# Patient Record
Sex: FEMALE | Race: WHITE
[De-identification: names, ages, dates, MRNs, and addresses within clinical notes are randomized per-mention and may not be internally consistent; named-entity substitution may affect disease eponyms.]

---

## 2018-11-02 ENCOUNTER — HOSPITAL ENCOUNTER (INPATIENT)
Dept: HOSPITAL 83 - SDC | Age: 73
LOS: 6 days | Discharge: HOME | DRG: 641 | End: 2018-11-08
Attending: INTERNAL MEDICINE | Admitting: INTERNAL MEDICINE
Payer: MEDICARE

## 2018-11-02 VITALS — WEIGHT: 65.44 LBS | BODY MASS INDEX: 12.04 KG/M2 | HEIGHT: 61.97 IN

## 2018-11-02 DIAGNOSIS — E87.6: Primary | ICD-10-CM

## 2018-11-02 DIAGNOSIS — Z91.19: ICD-10-CM

## 2018-11-02 DIAGNOSIS — Z93.1: ICD-10-CM

## 2018-11-02 DIAGNOSIS — Z90.49: ICD-10-CM

## 2018-11-02 DIAGNOSIS — I10: ICD-10-CM

## 2018-11-02 DIAGNOSIS — K57.30: ICD-10-CM

## 2018-11-02 DIAGNOSIS — Z90.721: ICD-10-CM

## 2018-11-02 DIAGNOSIS — E44.0: ICD-10-CM

## 2018-11-02 DIAGNOSIS — Z79.899: ICD-10-CM

## 2018-11-02 DIAGNOSIS — Z79.52: ICD-10-CM

## 2018-11-02 DIAGNOSIS — K44.9: ICD-10-CM

## 2018-11-02 DIAGNOSIS — E27.40: ICD-10-CM

## 2018-11-02 DIAGNOSIS — M19.90: ICD-10-CM

## 2018-11-02 DIAGNOSIS — K29.60: ICD-10-CM

## 2018-11-02 DIAGNOSIS — E87.1: ICD-10-CM

## 2018-11-02 DIAGNOSIS — M06.9: ICD-10-CM

## 2018-11-02 DIAGNOSIS — F32.9: ICD-10-CM

## 2018-11-02 DIAGNOSIS — M79.7: ICD-10-CM

## 2018-11-02 DIAGNOSIS — I25.10: ICD-10-CM

## 2018-11-02 DIAGNOSIS — Z85.3: ICD-10-CM

## 2018-11-02 DIAGNOSIS — E03.9: ICD-10-CM

## 2018-11-05 VITALS — DIASTOLIC BLOOD PRESSURE: 88 MMHG | SYSTOLIC BLOOD PRESSURE: 139 MMHG

## 2018-11-05 VITALS — SYSTOLIC BLOOD PRESSURE: 144 MMHG | DIASTOLIC BLOOD PRESSURE: 90 MMHG

## 2018-11-05 VITALS — SYSTOLIC BLOOD PRESSURE: 146 MMHG | DIASTOLIC BLOOD PRESSURE: 76 MMHG

## 2018-11-05 VITALS — DIASTOLIC BLOOD PRESSURE: 80 MMHG | SYSTOLIC BLOOD PRESSURE: 131 MMHG

## 2018-11-05 LAB
CHLORIDE SERPL-SCNC: 96 MMOL/L (ref 98–107)
POTASSIUM SERPL-SCNC: 2.1 MMOL/L (ref 3.5–5.1)
SODIUM SERPL-SCNC: 133 MMOL/L (ref 136–145)

## 2018-11-06 VITALS — DIASTOLIC BLOOD PRESSURE: 84 MMHG

## 2018-11-06 VITALS — DIASTOLIC BLOOD PRESSURE: 75 MMHG | SYSTOLIC BLOOD PRESSURE: 140 MMHG

## 2018-11-06 VITALS — DIASTOLIC BLOOD PRESSURE: 77 MMHG | SYSTOLIC BLOOD PRESSURE: 143 MMHG

## 2018-11-06 VITALS — DIASTOLIC BLOOD PRESSURE: 65 MMHG

## 2018-11-06 VITALS — SYSTOLIC BLOOD PRESSURE: 103 MMHG | DIASTOLIC BLOOD PRESSURE: 64 MMHG

## 2018-11-06 VITALS — DIASTOLIC BLOOD PRESSURE: 66 MMHG

## 2018-11-06 VITALS — DIASTOLIC BLOOD PRESSURE: 75 MMHG | SYSTOLIC BLOOD PRESSURE: 116 MMHG

## 2018-11-06 VITALS — DIASTOLIC BLOOD PRESSURE: 73 MMHG

## 2018-11-06 LAB
25(OH)D3 SERPL-MCNC: 17.5 NG/ML (ref 30–100)
ALBUMIN SERPL-MCNC: 2.5 GM/DL (ref 3.1–4.5)
ALBUMIN SERPL-MCNC: 2.5 GM/DL (ref 3.1–4.5)
ALP SERPL-CCNC: 328 U/L (ref 45–117)
ALP SERPL-CCNC: 344 U/L (ref 45–117)
ALT SERPL W P-5'-P-CCNC: 13 U/L (ref 12–78)
ALT SERPL W P-5'-P-CCNC: 14 U/L (ref 12–78)
APTT PPP: 25.9 SECONDS (ref 20.8–31.5)
AST SERPL-CCNC: 26 IU/L (ref 3–35)
AST SERPL-CCNC: 30 IU/L (ref 3–35)
BASOPHILS # BLD AUTO: 0.1 10*3/UL (ref 0–0.1)
BASOPHILS NFR BLD AUTO: 0.9 % (ref 0–1)
BUN SERPL-MCNC: 4 MG/DL (ref 7–24)
BUN SERPL-MCNC: 5 MG/DL (ref 7–24)
CHLORIDE SERPL-SCNC: 103 MMOL/L (ref 98–107)
CHLORIDE SERPL-SCNC: 104 MMOL/L (ref 98–107)
CHOLEST SERPL-MCNC: 118 MG/DL (ref ?–200)
CREAT SERPL-MCNC: 0.64 MG/DL (ref 0.55–1.02)
CREAT SERPL-MCNC: 0.66 MG/DL (ref 0.55–1.02)
EOSINOPHIL # BLD AUTO: 0.5 10*3/UL (ref 0–0.4)
EOSINOPHIL # BLD AUTO: 5.6 % (ref 1–4)
ERYTHROCYTE [DISTWIDTH] IN BLOOD BY AUTOMATED COUNT: 18.1 % (ref 0–14.5)
HCT VFR BLD AUTO: 33.4 % (ref 37–47)
HDLC SERPL-MCNC: 55 MG/DL (ref 40–60)
HGB BLD-MCNC: 10.6 G/DL (ref 12–16)
INR BLD: 1.1 (ref 2–3.5)
LDLC SERPL DIRECT ASSAY-MCNC: 40 MG/DL (ref 9–159)
LYMPHOCYTES # BLD AUTO: 2.1 10*3/UL (ref 1.3–4.4)
LYMPHOCYTES NFR BLD AUTO: 24.2 % (ref 27–41)
MCH RBC QN AUTO: 26.1 PG (ref 27–31)
MCHC RBC AUTO-ENTMCNC: 31.7 G/DL (ref 33–37)
MCV RBC AUTO: 82.3 FL (ref 81–99)
MONOCYTES # BLD AUTO: 0.9 10*3/UL (ref 0.1–1)
MONOCYTES NFR BLD MANUAL: 10.7 % (ref 3–9)
NEUT #: 5.1 10*3/UL (ref 2.3–7.9)
NEUT %: 58.3 % (ref 47–73)
NRBC BLD QL AUTO: 0 10*3/UL (ref 0–0)
PHOSPHATE SERPL-MCNC: 1.3 MG/DL (ref 2.5–4.9)
PHOSPHATE SERPL-MCNC: 1.5 MG/DL (ref 2.5–4.9)
PLATELET # BLD AUTO: 341 10*3/UL (ref 130–400)
PMV BLD AUTO: 9.6 FL (ref 9.6–12.3)
POTASSIUM SERPL-SCNC: 3.1 MMOL/L (ref 3.5–5.1)
POTASSIUM SERPL-SCNC: 3.3 MMOL/L (ref 3.5–5.1)
PROT SERPL-MCNC: 5.2 GM/DL (ref 6.4–8.2)
PROT SERPL-MCNC: 5.4 GM/DL (ref 6.4–8.2)
RBC # BLD AUTO: 4.06 10*6/UL (ref 4.1–5.1)
SODIUM SERPL-SCNC: 133 MMOL/L (ref 136–145)
SODIUM SERPL-SCNC: 135 MMOL/L (ref 136–145)
T4 FREE SERPL-MCNC: 0.8 NG/DL (ref 0.76–1.46)
TRIGL SERPL-MCNC: 114 MG/DL (ref ?–150)
TROPONIN I SERPL-MCNC: 0.03 NG/ML (ref ?–0.04)
TSH SERPL DL<=0.005 MIU/L-ACNC: 18.7 UIU/ML (ref 0.36–4.75)
VITAMIN B12: 607 PG/ML (ref 247–911)
VLDLC SERPL CALC-MCNC: 23 MG/DL (ref 6–40)
WBC NRBC COR # BLD AUTO: 8.8 10*3/UL (ref 4.8–10.8)

## 2018-11-07 VITALS — SYSTOLIC BLOOD PRESSURE: 118 MMHG | DIASTOLIC BLOOD PRESSURE: 71 MMHG

## 2018-11-07 VITALS — DIASTOLIC BLOOD PRESSURE: 82 MMHG | SYSTOLIC BLOOD PRESSURE: 128 MMHG

## 2018-11-07 VITALS — DIASTOLIC BLOOD PRESSURE: 89 MMHG

## 2018-11-07 VITALS — SYSTOLIC BLOOD PRESSURE: 112 MMHG | DIASTOLIC BLOOD PRESSURE: 88 MMHG

## 2018-11-07 LAB
ALBUMIN SERPL-MCNC: 2.4 GM/DL (ref 3.1–4.5)
ALP SERPL-CCNC: 337 U/L (ref 45–117)
ALT SERPL W P-5'-P-CCNC: 13 U/L (ref 12–78)
AST SERPL-CCNC: 27 IU/L (ref 3–35)
BUN SERPL-MCNC: 2 MG/DL (ref 7–24)
CHLORIDE SERPL-SCNC: 104 MMOL/L (ref 98–107)
CREAT SERPL-MCNC: 0.56 MG/DL (ref 0.55–1.02)
PHOSPHATE SERPL-MCNC: 2 MG/DL (ref 2.5–4.9)
POTASSIUM SERPL-SCNC: 2.9 MMOL/L (ref 3.5–5.1)
PROT SERPL-MCNC: 5 GM/DL (ref 6.4–8.2)
SODIUM SERPL-SCNC: 135 MMOL/L (ref 136–145)

## 2018-11-07 PROCEDURE — 0DJD8ZZ INSPECTION OF LOWER INTESTINAL TRACT, VIA NATURAL OR ARTIFICIAL OPENING ENDOSCOPIC: ICD-10-PCS

## 2018-11-07 PROCEDURE — 0DJ08ZZ INSPECTION OF UPPER INTESTINAL TRACT, VIA NATURAL OR ARTIFICIAL OPENING ENDOSCOPIC: ICD-10-PCS

## 2018-11-08 VITALS — DIASTOLIC BLOOD PRESSURE: 70 MMHG

## 2018-11-08 VITALS — SYSTOLIC BLOOD PRESSURE: 136 MMHG | DIASTOLIC BLOOD PRESSURE: 80 MMHG

## 2018-11-08 VITALS — DIASTOLIC BLOOD PRESSURE: 77 MMHG

## 2018-11-08 LAB
ALBUMIN SERPL-MCNC: 2.2 GM/DL (ref 3.1–4.5)
ALP SERPL-CCNC: 341 U/L (ref 45–117)
ALT SERPL W P-5'-P-CCNC: 13 U/L (ref 12–78)
AST SERPL-CCNC: 24 IU/L (ref 3–35)
BUN SERPL-MCNC: 4 MG/DL (ref 7–24)
CHLORIDE SERPL-SCNC: 103 MMOL/L (ref 98–107)
CREAT SERPL-MCNC: 0.57 MG/DL (ref 0.55–1.02)
PHOSPHATE SERPL-MCNC: 3 MG/DL (ref 2.5–4.9)
POTASSIUM SERPL-SCNC: 3.3 MMOL/L (ref 3.5–5.1)
PROT SERPL-MCNC: 4.6 GM/DL (ref 6.4–8.2)
SODIUM SERPL-SCNC: 136 MMOL/L (ref 136–145)

## 2019-01-01 ENCOUNTER — HOSPITAL ENCOUNTER (INPATIENT)
Age: 74
LOS: 4 days | Discharge: NURSING, REHAB OR LONG-TERM CARE | DRG: 552 | End: 2019-12-13
Attending: EMERGENCY MEDICINE | Admitting: INTERNAL MEDICINE
Payer: MEDICARE

## 2019-01-01 ENCOUNTER — APPOINTMENT (OUTPATIENT)
Dept: MRI IMAGING | Age: 74
DRG: 552 | End: 2019-01-01
Payer: MEDICARE

## 2019-01-01 ENCOUNTER — HOSPITAL ENCOUNTER (OUTPATIENT)
Age: 74
Setting detail: SPECIMEN
Discharge: HOME | End: 2019-11-06
Payer: MEDICARE

## 2019-01-01 ENCOUNTER — HOSPITAL ENCOUNTER (OUTPATIENT)
Dept: PREOP | Age: 74
Discharge: HOME | End: 2019-11-07
Payer: MEDICARE

## 2019-01-01 ENCOUNTER — APPOINTMENT (OUTPATIENT)
Dept: GENERAL RADIOLOGY | Age: 74
DRG: 552 | End: 2019-01-01
Payer: MEDICARE

## 2019-01-01 ENCOUNTER — HOSPITAL ENCOUNTER (EMERGENCY)
Age: 74
Discharge: HOME | End: 2019-12-04
Attending: EMERGENCY MEDICINE
Payer: MEDICARE

## 2019-01-01 ENCOUNTER — APPOINTMENT (OUTPATIENT)
Dept: CT IMAGING | Age: 74
DRG: 552 | End: 2019-01-01
Payer: MEDICARE

## 2019-01-01 VITALS
BODY MASS INDEX: 13.9 KG/M2 | SYSTOLIC BLOOD PRESSURE: 162 MMHG | DIASTOLIC BLOOD PRESSURE: 100 MMHG | RESPIRATION RATE: 20 BRPM | TEMPERATURE: 97 F | WEIGHT: 76 LBS | HEART RATE: 100 BPM | OXYGEN SATURATION: 97 %

## 2019-01-01 VITALS
BODY MASS INDEX: 14.35 KG/M2 | SYSTOLIC BLOOD PRESSURE: 152 MMHG | TEMPERATURE: 98 F | WEIGHT: 76 LBS | OXYGEN SATURATION: 93 % | RESPIRATION RATE: 18 BRPM | DIASTOLIC BLOOD PRESSURE: 77 MMHG | HEIGHT: 61 IN | HEART RATE: 73 BPM

## 2019-01-01 VITALS
RESPIRATION RATE: 20 BRPM | TEMPERATURE: 98.1 F | DIASTOLIC BLOOD PRESSURE: 90 MMHG | HEART RATE: 86 BPM | OXYGEN SATURATION: 96 % | SYSTOLIC BLOOD PRESSURE: 186 MMHG

## 2019-01-01 DIAGNOSIS — M54.32 BILATERAL SCIATICA: Primary | ICD-10-CM

## 2019-01-01 DIAGNOSIS — M54.31 BILATERAL SCIATICA: Primary | ICD-10-CM

## 2019-01-01 LAB
ABO/RH: NORMAL
ALBUMIN SERPL-MCNC: 3.3 G/DL (ref 3.5–5.2)
ALP BLD-CCNC: 283 U/L (ref 35–104)
ALT SERPL-CCNC: 14 U/L (ref 0–32)
ANION GAP SERPL CALCULATED.3IONS-SCNC: 12 MMOL/L (ref 7–16)
ANION GAP SERPL CALCULATED.3IONS-SCNC: 14 MMOL/L (ref 7–16)
ANTIBODY IDENTIFICATION: NORMAL
ANTIBODY IDENTIFICATION: NORMAL
ANTIBODY SCREEN: NORMAL
APTT: 28.3 SEC (ref 24.5–35.1)
AST SERPL-CCNC: 19 U/L (ref 0–31)
BACTERIA: NORMAL /HPF
BASOPHILS ABSOLUTE: 0.02 E9/L (ref 0–0.2)
BASOPHILS ABSOLUTE: 0.03 E9/L (ref 0–0.2)
BASOPHILS RELATIVE PERCENT: 0.2 % (ref 0–2)
BASOPHILS RELATIVE PERCENT: 0.2 % (ref 0–2)
BILIRUB SERPL-MCNC: 0.3 MG/DL (ref 0–1.2)
BILIRUBIN URINE: NEGATIVE
BLOOD BANK DISPENSE STATUS: NORMAL
BLOOD BANK DISPENSE STATUS: NORMAL
BLOOD BANK PRODUCT CODE: NORMAL
BLOOD BANK PRODUCT CODE: NORMAL
BLOOD CULTURE, ROUTINE: NORMAL
BLOOD, URINE: NEGATIVE
BPU ID: NORMAL
BPU ID: NORMAL
BUN BLDV-MCNC: 14 MG/DL (ref 8–23)
BUN BLDV-MCNC: 19 MG/DL (ref 8–23)
C ANTIGEN: NORMAL
CALCIUM SERPL-MCNC: 8.6 MG/DL (ref 8.6–10.2)
CALCIUM SERPL-MCNC: 8.7 MG/DL (ref 8.6–10.2)
CHLORIDE BLD-SCNC: 101 MMOL/L (ref 98–107)
CHLORIDE BLD-SCNC: 98 MMOL/L (ref 98–107)
CLARITY: CLEAR
CO2: 18 MMOL/L (ref 22–29)
CO2: 21 MMOL/L (ref 22–29)
COLOR: YELLOW
CREAT SERPL-MCNC: 0.8 MG/DL (ref 0.5–1)
CREAT SERPL-MCNC: 0.8 MG/DL (ref 0.5–1)
CULTURE, BLOOD 2: NORMAL
DESCRIPTION BLOOD BANK: NORMAL
DESCRIPTION BLOOD BANK: NORMAL
E ANTIGEN: NORMAL
EKG ATRIAL RATE: 76 BPM
EKG P AXIS: 78 DEGREES
EKG P-R INTERVAL: 156 MS
EKG Q-T INTERVAL: 372 MS
EKG QRS DURATION: 68 MS
EKG QTC CALCULATION (BAZETT): 418 MS
EKG R AXIS: -69 DEGREES
EKG T AXIS: 66 DEGREES
EKG VENTRICULAR RATE: 76 BPM
EOSINOPHILS ABSOLUTE: 0.11 E9/L (ref 0.05–0.5)
EOSINOPHILS ABSOLUTE: 0.12 E9/L (ref 0.05–0.5)
EOSINOPHILS RELATIVE PERCENT: 0.7 % (ref 0–6)
EOSINOPHILS RELATIVE PERCENT: 1 % (ref 0–6)
GFR AFRICAN AMERICAN: >60
GFR AFRICAN AMERICAN: >60
GFR NON-AFRICAN AMERICAN: >60 ML/MIN/1.73
GFR NON-AFRICAN AMERICAN: >60 ML/MIN/1.73
GLUCOSE BLD-MCNC: 104 MG/DL (ref 74–99)
GLUCOSE BLD-MCNC: 75 MG/DL (ref 74–99)
GLUCOSE URINE: NEGATIVE MG/DL
HCT VFR BLD CALC: 38.1 % (ref 34–48)
HCT VFR BLD CALC: 39.7 % (ref 34–48)
HEMOGLOBIN: 11.9 G/DL (ref 11.5–15.5)
HEMOGLOBIN: 12.4 G/DL (ref 11.5–15.5)
IMMATURE GRANULOCYTES #: 0.08 E9/L
IMMATURE GRANULOCYTES #: 0.1 E9/L
IMMATURE GRANULOCYTES %: 0.6 % (ref 0–5)
IMMATURE GRANULOCYTES %: 0.6 % (ref 0–5)
INR BLD: 1
KETONES, URINE: NEGATIVE MG/DL
LACTIC ACID, SEPSIS: 0.7 MMOL/L (ref 0.5–1.9)
LEUKOCYTE ESTERASE, URINE: NEGATIVE
LIPASE: 17 U/L (ref 13–60)
LYMPHOCYTES ABSOLUTE: 1.77 E9/L (ref 1.5–4)
LYMPHOCYTES ABSOLUTE: 1.8 E9/L (ref 1.5–4)
LYMPHOCYTES RELATIVE PERCENT: 10.9 % (ref 20–42)
LYMPHOCYTES RELATIVE PERCENT: 14.4 % (ref 20–42)
MCH RBC QN AUTO: 26.2 PG (ref 26–35)
MCH RBC QN AUTO: 26.3 PG (ref 26–35)
MCHC RBC AUTO-ENTMCNC: 31.2 % (ref 32–34.5)
MCHC RBC AUTO-ENTMCNC: 31.2 % (ref 32–34.5)
MCV RBC AUTO: 83.9 FL (ref 80–99.9)
MCV RBC AUTO: 84.3 FL (ref 80–99.9)
MONOCYTES ABSOLUTE: 0.89 E9/L (ref 0.1–0.95)
MONOCYTES ABSOLUTE: 1.2 E9/L (ref 0.1–0.95)
MONOCYTES RELATIVE PERCENT: 7.1 % (ref 2–12)
MONOCYTES RELATIVE PERCENT: 7.4 % (ref 2–12)
NEUTROPHILS ABSOLUTE: 13.04 E9/L (ref 1.8–7.3)
NEUTROPHILS ABSOLUTE: 9.59 E9/L (ref 1.8–7.3)
NEUTROPHILS RELATIVE PERCENT: 76.7 % (ref 43–80)
NEUTROPHILS RELATIVE PERCENT: 80.2 % (ref 43–80)
NITRITE, URINE: NEGATIVE
PDW BLD-RTO: 17.2 FL (ref 11.5–15)
PDW BLD-RTO: 17.3 FL (ref 11.5–15)
PH UA: 7.5 (ref 5–9)
PLATELET # BLD: 327 E9/L (ref 130–450)
PLATELET # BLD: 344 E9/L (ref 130–450)
PMV BLD AUTO: 8.9 FL (ref 7–12)
PMV BLD AUTO: 9 FL (ref 7–12)
POTASSIUM REFLEX MAGNESIUM: 3.6 MMOL/L (ref 3.5–5)
POTASSIUM REFLEX MAGNESIUM: 3.8 MMOL/L (ref 3.5–5)
PROTEIN UA: 100 MG/DL
PROTHROMBIN TIME: 11.7 SEC (ref 9.3–12.4)
RBC # BLD: 4.52 E12/L (ref 3.5–5.5)
RBC # BLD: 4.73 E12/L (ref 3.5–5.5)
RBC UA: NORMAL /HPF (ref 0–2)
SODIUM BLD-SCNC: 131 MMOL/L (ref 132–146)
SODIUM BLD-SCNC: 133 MMOL/L (ref 132–146)
SPECIFIC GRAVITY UA: 1.01 (ref 1–1.03)
TOTAL PROTEIN: 6.8 G/DL (ref 6.4–8.3)
TROPONIN: <0.01 NG/ML (ref 0–0.03)
URINE CULTURE, ROUTINE: NORMAL
UROBILINOGEN, URINE: 0.2 E.U./DL
WBC # BLD: 12.5 E9/L (ref 4.5–11.5)
WBC # BLD: 16.3 E9/L (ref 4.5–11.5)
WBC UA: NORMAL /HPF (ref 0–5)

## 2019-01-01 PROCEDURE — 36415 COLL VENOUS BLD VENIPUNCTURE: CPT

## 2019-01-01 PROCEDURE — 86900 BLOOD TYPING SEROLOGIC ABO: CPT

## 2019-01-01 PROCEDURE — 87088 URINE BACTERIA CULTURE: CPT

## 2019-01-01 PROCEDURE — P9016 RBC LEUKOCYTES REDUCED: HCPCS

## 2019-01-01 PROCEDURE — 1200000000 HC SEMI PRIVATE

## 2019-01-01 PROCEDURE — 2580000003 HC RX 258: Performed by: INTERNAL MEDICINE

## 2019-01-01 PROCEDURE — 6370000000 HC RX 637 (ALT 250 FOR IP): Performed by: INTERNAL MEDICINE

## 2019-01-01 PROCEDURE — 84484 ASSAY OF TROPONIN QUANT: CPT

## 2019-01-01 PROCEDURE — 99222 1ST HOSP IP/OBS MODERATE 55: CPT | Performed by: NEUROLOGICAL SURGERY

## 2019-01-01 PROCEDURE — 86901 BLOOD TYPING SEROLOGIC RH(D): CPT

## 2019-01-01 PROCEDURE — 97530 THERAPEUTIC ACTIVITIES: CPT

## 2019-01-01 PROCEDURE — 85025 COMPLETE CBC W/AUTO DIFF WBC: CPT

## 2019-01-01 PROCEDURE — 72148 MRI LUMBAR SPINE W/O DYE: CPT

## 2019-01-01 PROCEDURE — 81001 URINALYSIS AUTO W/SCOPE: CPT

## 2019-01-01 PROCEDURE — 83605 ASSAY OF LACTIC ACID: CPT

## 2019-01-01 PROCEDURE — 86870 RBC ANTIBODY IDENTIFICATION: CPT

## 2019-01-01 PROCEDURE — 6360000002 HC RX W HCPCS: Performed by: INTERNAL MEDICINE

## 2019-01-01 PROCEDURE — 83690 ASSAY OF LIPASE: CPT

## 2019-01-01 PROCEDURE — 86905 BLOOD TYPING RBC ANTIGENS: CPT

## 2019-01-01 PROCEDURE — 97161 PT EVAL LOW COMPLEX 20 MIN: CPT

## 2019-01-01 PROCEDURE — 96372 THER/PROPH/DIAG INJ SC/IM: CPT

## 2019-01-01 PROCEDURE — G0381 LEV 2 HOSP TYPE B ED VISIT: HCPCS

## 2019-01-01 PROCEDURE — 86850 RBC ANTIBODY SCREEN: CPT

## 2019-01-01 PROCEDURE — 70450 CT HEAD/BRAIN W/O DYE: CPT

## 2019-01-01 PROCEDURE — 85610 PROTHROMBIN TIME: CPT

## 2019-01-01 PROCEDURE — 71045 X-RAY EXAM CHEST 1 VIEW: CPT

## 2019-01-01 PROCEDURE — 99285 EMERGENCY DEPT VISIT HI MDM: CPT

## 2019-01-01 PROCEDURE — 87040 BLOOD CULTURE FOR BACTERIA: CPT

## 2019-01-01 PROCEDURE — 97166 OT EVAL MOD COMPLEX 45 MIN: CPT

## 2019-01-01 PROCEDURE — 36430 TRANSFUSION BLD/BLD COMPNT: CPT

## 2019-01-01 PROCEDURE — 96374 THER/PROPH/DIAG INJ IV PUSH: CPT

## 2019-01-01 PROCEDURE — 93010 ELECTROCARDIOGRAM REPORT: CPT | Performed by: INTERNAL MEDICINE

## 2019-01-01 PROCEDURE — 6360000002 HC RX W HCPCS: Performed by: EMERGENCY MEDICINE

## 2019-01-01 PROCEDURE — 86922 COMPATIBILITY TEST ANTIGLOB: CPT

## 2019-01-01 PROCEDURE — 72131 CT LUMBAR SPINE W/O DYE: CPT

## 2019-01-01 PROCEDURE — 93005 ELECTROCARDIOGRAM TRACING: CPT | Performed by: EMERGENCY MEDICINE

## 2019-01-01 PROCEDURE — 85730 THROMBOPLASTIN TIME PARTIAL: CPT

## 2019-01-01 PROCEDURE — 80048 BASIC METABOLIC PNL TOTAL CA: CPT

## 2019-01-01 PROCEDURE — 80053 COMPREHEN METABOLIC PANEL: CPT

## 2019-01-01 RX ORDER — CEFAZOLIN SODIUM 1 G/50ML
1 SOLUTION INTRAVENOUS SEE ADMIN INSTRUCTIONS
Status: DISCONTINUED | OUTPATIENT
Start: 2019-01-01 | End: 2019-01-01 | Stop reason: HOSPADM

## 2019-01-01 RX ORDER — QUETIAPINE FUMARATE 25 MG/1
12.5 TABLET, FILM COATED ORAL EVERY 6 HOURS PRN
Qty: 60 TABLET | Refills: 3 | Status: ON HOLD | DISCHARGE
Start: 2019-01-01 | End: 2020-01-01 | Stop reason: HOSPADM

## 2019-01-01 RX ORDER — ASPIRIN 81 MG/1
81 TABLET ORAL DAILY
Status: DISCONTINUED | OUTPATIENT
Start: 2019-01-01 | End: 2019-01-01 | Stop reason: HOSPADM

## 2019-01-01 RX ORDER — ACETAMINOPHEN 325 MG/1
650 TABLET ORAL EVERY 4 HOURS PRN
Status: DISCONTINUED | OUTPATIENT
Start: 2019-01-01 | End: 2019-01-01 | Stop reason: HOSPADM

## 2019-01-01 RX ORDER — MELOXICAM 7.5 MG/1
15 TABLET ORAL DAILY
Status: DISCONTINUED | OUTPATIENT
Start: 2019-01-01 | End: 2019-01-01 | Stop reason: HOSPADM

## 2019-01-01 RX ORDER — ONDANSETRON 2 MG/ML
4 INJECTION INTRAMUSCULAR; INTRAVENOUS EVERY 6 HOURS PRN
Status: DISCONTINUED | OUTPATIENT
Start: 2019-01-01 | End: 2019-01-01 | Stop reason: HOSPADM

## 2019-01-01 RX ORDER — OXYCODONE AND ACETAMINOPHEN 7.5; 325 MG/1; MG/1
1 TABLET ORAL EVERY 6 HOURS PRN
Status: DISCONTINUED | OUTPATIENT
Start: 2019-01-01 | End: 2019-01-01 | Stop reason: SDUPTHER

## 2019-01-01 RX ORDER — PREDNISONE 1 MG/1
5 TABLET ORAL DAILY
Status: DISCONTINUED | OUTPATIENT
Start: 2019-01-01 | End: 2019-01-01 | Stop reason: HOSPADM

## 2019-01-01 RX ORDER — MELOXICAM 15 MG/1
15 TABLET ORAL DAILY
Qty: 30 TABLET | Refills: 0 | Status: ON HOLD | OUTPATIENT
Start: 2019-01-01 | End: 2020-01-01 | Stop reason: HOSPADM

## 2019-01-01 RX ORDER — DEXAMETHASONE SODIUM PHOSPHATE 10 MG/ML
10 INJECTION, SOLUTION INTRAMUSCULAR; INTRAVENOUS ONCE
Status: COMPLETED | OUTPATIENT
Start: 2019-01-01 | End: 2019-01-01

## 2019-01-01 RX ORDER — SODIUM CHLORIDE 0.9 % (FLUSH) 0.9 %
10 SYRINGE (ML) INJECTION EVERY 12 HOURS SCHEDULED
Status: DISCONTINUED | OUTPATIENT
Start: 2019-01-01 | End: 2019-01-01 | Stop reason: HOSPADM

## 2019-01-01 RX ORDER — CALCIUM CARBONATE 200(500)MG
500 TABLET,CHEWABLE ORAL 2 TIMES DAILY
Status: DISCONTINUED | OUTPATIENT
Start: 2019-01-01 | End: 2019-01-01 | Stop reason: HOSPADM

## 2019-01-01 RX ORDER — PREGABALIN 150 MG/1
150 CAPSULE ORAL 3 TIMES DAILY
Status: DISCONTINUED | OUTPATIENT
Start: 2019-01-01 | End: 2019-01-01 | Stop reason: HOSPADM

## 2019-01-01 RX ORDER — SODIUM CHLORIDE 0.9 % (FLUSH) 0.9 %
10 SYRINGE (ML) INJECTION PRN
Status: DISCONTINUED | OUTPATIENT
Start: 2019-01-01 | End: 2019-01-01 | Stop reason: HOSPADM

## 2019-01-01 RX ORDER — 0.9 % SODIUM CHLORIDE 0.9 %
250 INTRAVENOUS SOLUTION INTRAVENOUS ONCE
Status: COMPLETED | OUTPATIENT
Start: 2019-01-01 | End: 2019-01-01

## 2019-01-01 RX ORDER — CITALOPRAM 20 MG/1
20 TABLET ORAL DAILY
Status: DISCONTINUED | OUTPATIENT
Start: 2019-01-01 | End: 2019-01-01 | Stop reason: HOSPADM

## 2019-01-01 RX ORDER — KETOROLAC TROMETHAMINE 30 MG/ML
30 INJECTION, SOLUTION INTRAMUSCULAR; INTRAVENOUS ONCE
Status: COMPLETED | OUTPATIENT
Start: 2019-01-01 | End: 2019-01-01

## 2019-01-01 RX ORDER — LEVOTHYROXINE SODIUM 0.05 MG/1
50 TABLET ORAL DAILY
Status: DISCONTINUED | OUTPATIENT
Start: 2019-01-01 | End: 2019-01-01 | Stop reason: HOSPADM

## 2019-01-01 RX ORDER — OXYCODONE HYDROCHLORIDE AND ACETAMINOPHEN 5; 325 MG/1; MG/1
1 TABLET ORAL EVERY 6 HOURS PRN
Status: DISCONTINUED | OUTPATIENT
Start: 2019-01-01 | End: 2019-01-01 | Stop reason: HOSPADM

## 2019-01-01 RX ORDER — OXYCODONE HYDROCHLORIDE 5 MG/1
2.5 TABLET ORAL EVERY 6 HOURS PRN
Status: DISCONTINUED | OUTPATIENT
Start: 2019-01-01 | End: 2019-01-01 | Stop reason: HOSPADM

## 2019-01-01 RX ORDER — ISOSORBIDE MONONITRATE 30 MG/1
30 TABLET, EXTENDED RELEASE ORAL DAILY
Status: DISCONTINUED | OUTPATIENT
Start: 2019-01-01 | End: 2019-01-01 | Stop reason: HOSPADM

## 2019-01-01 RX ORDER — MORPHINE SULFATE 15 MG/1
15 TABLET, FILM COATED, EXTENDED RELEASE ORAL 2 TIMES DAILY
Status: DISCONTINUED | OUTPATIENT
Start: 2019-01-01 | End: 2019-01-01 | Stop reason: ALTCHOICE

## 2019-01-01 RX ORDER — LEFLUNOMIDE 10 MG/1
10 TABLET ORAL 2 TIMES DAILY
Status: DISCONTINUED | OUTPATIENT
Start: 2019-01-01 | End: 2019-01-01 | Stop reason: HOSPADM

## 2019-01-01 RX ORDER — SUCRALFATE 1 G/1
1 TABLET ORAL 4 TIMES DAILY
Status: DISCONTINUED | OUTPATIENT
Start: 2019-01-01 | End: 2019-01-01 | Stop reason: HOSPADM

## 2019-01-01 RX ORDER — ZONISAMIDE 100 MG/1
200 CAPSULE ORAL 2 TIMES DAILY
Status: DISCONTINUED | OUTPATIENT
Start: 2019-01-01 | End: 2019-01-01 | Stop reason: HOSPADM

## 2019-01-01 RX ORDER — SIMVASTATIN 20 MG
20 TABLET ORAL DAILY
Status: DISCONTINUED | OUTPATIENT
Start: 2019-01-01 | End: 2019-01-01 | Stop reason: HOSPADM

## 2019-01-01 RX ORDER — CYCLOBENZAPRINE HCL 10 MG
10 TABLET ORAL DAILY PRN
Status: DISCONTINUED | OUTPATIENT
Start: 2019-01-01 | End: 2019-01-01

## 2019-01-01 RX ORDER — METHYLPREDNISOLONE 4 MG/1
TABLET ORAL
Qty: 1 KIT | Refills: 0 | Status: ON HOLD | OUTPATIENT
Start: 2019-01-01 | End: 2019-01-01 | Stop reason: HOSPADM

## 2019-01-01 RX ORDER — LORAZEPAM 0.5 MG/1
0.5 TABLET ORAL EVERY 6 HOURS PRN
Status: DISCONTINUED | OUTPATIENT
Start: 2019-01-01 | End: 2019-01-01

## 2019-01-01 RX ORDER — OXYCODONE HYDROCHLORIDE AND ACETAMINOPHEN 5; 325 MG/1; MG/1
1 TABLET ORAL EVERY 4 HOURS PRN
Status: DISCONTINUED | OUTPATIENT
Start: 2019-01-01 | End: 2019-01-01

## 2019-01-01 RX ORDER — OXYCODONE AND ACETAMINOPHEN 7.5; 325 MG/1; MG/1
1 TABLET ORAL EVERY 8 HOURS PRN
Qty: 6 TABLET | Refills: 0 | Status: ON HOLD | OUTPATIENT
Start: 2019-01-01 | End: 2020-01-01 | Stop reason: HOSPADM

## 2019-01-01 RX ORDER — QUETIAPINE FUMARATE 25 MG/1
12.5 TABLET, FILM COATED ORAL EVERY 6 HOURS PRN
Status: DISCONTINUED | OUTPATIENT
Start: 2019-01-01 | End: 2019-01-01 | Stop reason: HOSPADM

## 2019-01-01 RX ORDER — LORAZEPAM 2 MG/ML
0.5 INJECTION INTRAMUSCULAR ONCE
Status: COMPLETED | OUTPATIENT
Start: 2019-01-01 | End: 2019-01-01

## 2019-01-01 RX ORDER — PANTOPRAZOLE SODIUM 40 MG/1
40 TABLET, DELAYED RELEASE ORAL
Status: DISCONTINUED | OUTPATIENT
Start: 2019-01-01 | End: 2019-01-01 | Stop reason: HOSPADM

## 2019-01-01 RX ORDER — OXYBUTYNIN CHLORIDE 5 MG/1
5 TABLET ORAL 3 TIMES DAILY
Status: DISCONTINUED | OUTPATIENT
Start: 2019-01-01 | End: 2019-01-01 | Stop reason: HOSPADM

## 2019-01-01 RX ORDER — OXYCODONE HYDROCHLORIDE 5 MG/1
2.5 TABLET ORAL ONCE
Status: COMPLETED | OUTPATIENT
Start: 2019-01-01 | End: 2019-01-01

## 2019-01-01 RX ORDER — OXYCODONE HYDROCHLORIDE 5 MG/1
2.5 TABLET ORAL EVERY 4 HOURS PRN
Status: DISCONTINUED | OUTPATIENT
Start: 2019-01-01 | End: 2019-01-01

## 2019-01-01 RX ADMIN — PANTOPRAZOLE SODIUM 40 MG: 40 TABLET, DELAYED RELEASE ORAL at 06:31

## 2019-01-01 RX ADMIN — OXYCODONE HYDROCHLORIDE AND ACETAMINOPHEN 1 TABLET: 5; 325 TABLET ORAL at 22:58

## 2019-01-01 RX ADMIN — PREDNISONE 5 MG: 5 TABLET ORAL at 08:40

## 2019-01-01 RX ADMIN — ZONISAMIDE 200 MG: 100 CAPSULE ORAL at 09:36

## 2019-01-01 RX ADMIN — PANTOPRAZOLE SODIUM 40 MG: 40 TABLET, DELAYED RELEASE ORAL at 06:12

## 2019-01-01 RX ADMIN — CALCIUM CARBONATE (ANTACID) CHEW TAB 500 MG 500 MG: 500 CHEW TAB at 08:36

## 2019-01-01 RX ADMIN — OXYCODONE HYDROCHLORIDE 2.5 MG: 5 TABLET ORAL at 08:37

## 2019-01-01 RX ADMIN — SODIUM CHLORIDE, PRESERVATIVE FREE 10 ML: 5 INJECTION INTRAVENOUS at 21:13

## 2019-01-01 RX ADMIN — OXYCODONE HYDROCHLORIDE 2.5 MG: 5 TABLET ORAL at 15:21

## 2019-01-01 RX ADMIN — LEVOTHYROXINE SODIUM 50 MCG: 50 TABLET ORAL at 06:31

## 2019-01-01 RX ADMIN — SIMVASTATIN 20 MG: 20 TABLET, FILM COATED ORAL at 13:02

## 2019-01-01 RX ADMIN — PREGABALIN 150 MG: 150 CAPSULE ORAL at 09:36

## 2019-01-01 RX ADMIN — OXYCODONE HYDROCHLORIDE AND ACETAMINOPHEN 1 TABLET: 5; 325 TABLET ORAL at 04:57

## 2019-01-01 RX ADMIN — KETOROLAC TROMETHAMINE 30 MG: 30 INJECTION, SOLUTION INTRAMUSCULAR; INTRAVENOUS at 17:11

## 2019-01-01 RX ADMIN — OXYCODONE HYDROCHLORIDE AND ACETAMINOPHEN 1 TABLET: 5; 325 TABLET ORAL at 16:43

## 2019-01-01 RX ADMIN — OXYCODONE HYDROCHLORIDE 2.5 MG: 5 TABLET ORAL at 23:26

## 2019-01-01 RX ADMIN — METOPROLOL TARTRATE 25 MG: 25 TABLET ORAL at 08:40

## 2019-01-01 RX ADMIN — PREGABALIN 150 MG: 150 CAPSULE ORAL at 21:10

## 2019-01-01 RX ADMIN — SUCRALFATE 1 G: 1 TABLET ORAL at 21:10

## 2019-01-01 RX ADMIN — ASPIRIN 81 MG: 81 TABLET, COATED ORAL at 09:37

## 2019-01-01 RX ADMIN — ZONISAMIDE 200 MG: 100 CAPSULE ORAL at 08:41

## 2019-01-01 RX ADMIN — SIMVASTATIN 20 MG: 20 TABLET, FILM COATED ORAL at 09:37

## 2019-01-01 RX ADMIN — PREGABALIN 150 MG: 150 CAPSULE ORAL at 23:25

## 2019-01-01 RX ADMIN — SUCRALFATE 1 G: 1 TABLET ORAL at 17:53

## 2019-01-01 RX ADMIN — CITALOPRAM 20 MG: 20 TABLET, FILM COATED ORAL at 08:35

## 2019-01-01 RX ADMIN — LEVOTHYROXINE SODIUM 50 MCG: 50 TABLET ORAL at 06:09

## 2019-01-01 RX ADMIN — OXYBUTYNIN CHLORIDE 5 MG: 5 TABLET ORAL at 09:36

## 2019-01-01 RX ADMIN — OXYCODONE HYDROCHLORIDE 2.5 MG: 5 TABLET ORAL at 08:39

## 2019-01-01 RX ADMIN — SODIUM CHLORIDE 250 ML: 9 INJECTION, SOLUTION INTRAVENOUS at 08:27

## 2019-01-01 RX ADMIN — PREGABALIN 150 MG: 150 CAPSULE ORAL at 21:34

## 2019-01-01 RX ADMIN — ENOXAPARIN SODIUM 30 MG: 30 INJECTION SUBCUTANEOUS at 10:23

## 2019-01-01 RX ADMIN — ENOXAPARIN SODIUM 40 MG: 40 INJECTION SUBCUTANEOUS at 08:41

## 2019-01-01 RX ADMIN — SUCRALFATE 1 G: 1 TABLET ORAL at 08:40

## 2019-01-01 RX ADMIN — SUCRALFATE 1 G: 1 TABLET ORAL at 21:34

## 2019-01-01 RX ADMIN — ASPIRIN 81 MG: 81 TABLET, COATED ORAL at 08:40

## 2019-01-01 RX ADMIN — DEXAMETHASONE SODIUM PHOSPHATE 10 MG: 10 INJECTION INTRAMUSCULAR; INTRAVENOUS at 17:07

## 2019-01-01 RX ADMIN — LEFLUNOMIDE 10 MG: 10 TABLET ORAL at 23:25

## 2019-01-01 RX ADMIN — ACETAMINOPHEN 650 MG: 325 TABLET, FILM COATED ORAL at 23:34

## 2019-01-01 RX ADMIN — CALCIUM CARBONATE (ANTACID) CHEW TAB 500 MG 500 MG: 500 CHEW TAB at 23:25

## 2019-01-01 RX ADMIN — METOPROLOL TARTRATE 25 MG: 25 TABLET ORAL at 10:22

## 2019-01-01 RX ADMIN — ISOSORBIDE MONONITRATE 30 MG: 30 TABLET ORAL at 08:35

## 2019-01-01 RX ADMIN — CALCIUM CARBONATE (ANTACID) CHEW TAB 500 MG 500 MG: 500 CHEW TAB at 21:10

## 2019-01-01 RX ADMIN — CYCLOBENZAPRINE 10 MG: 10 TABLET, FILM COATED ORAL at 08:40

## 2019-01-01 RX ADMIN — OXYCODONE HYDROCHLORIDE AND ACETAMINOPHEN 1 TABLET: 5; 325 TABLET ORAL at 15:21

## 2019-01-01 RX ADMIN — OXYCODONE HYDROCHLORIDE 2.5 MG: 5 TABLET ORAL at 16:43

## 2019-01-01 RX ADMIN — OXYCODONE HYDROCHLORIDE AND ACETAMINOPHEN 1 TABLET: 5; 325 TABLET ORAL at 08:35

## 2019-01-01 RX ADMIN — LEFLUNOMIDE 10 MG: 10 TABLET ORAL at 21:34

## 2019-01-01 RX ADMIN — LEFLUNOMIDE 10 MG: 10 TABLET ORAL at 08:41

## 2019-01-01 RX ADMIN — PREDNISONE 5 MG: 5 TABLET ORAL at 09:37

## 2019-01-01 RX ADMIN — SODIUM CHLORIDE, PRESERVATIVE FREE 10 ML: 5 INJECTION INTRAVENOUS at 21:35

## 2019-01-01 RX ADMIN — MELOXICAM 15 MG: 7.5 TABLET ORAL at 09:36

## 2019-01-01 RX ADMIN — PREDNISONE 5 MG: 5 TABLET ORAL at 08:35

## 2019-01-01 RX ADMIN — ZONISAMIDE 200 MG: 100 CAPSULE ORAL at 08:37

## 2019-01-01 RX ADMIN — LORAZEPAM 0.5 MG: 2 INJECTION INTRAMUSCULAR; INTRAVENOUS at 06:09

## 2019-01-01 RX ADMIN — OXYCODONE 2.5 MG: 5 TABLET ORAL at 10:59

## 2019-01-01 RX ADMIN — SUCRALFATE 1 G: 1 TABLET ORAL at 17:24

## 2019-01-01 RX ADMIN — SUCRALFATE 1 G: 1 TABLET ORAL at 23:25

## 2019-01-01 RX ADMIN — LEVOTHYROXINE SODIUM 50 MCG: 50 TABLET ORAL at 06:23

## 2019-01-01 RX ADMIN — SUCRALFATE 1 G: 1 TABLET ORAL at 09:36

## 2019-01-01 RX ADMIN — ISOSORBIDE MONONITRATE 30 MG: 30 TABLET ORAL at 09:37

## 2019-01-01 RX ADMIN — ISOSORBIDE MONONITRATE 30 MG: 30 TABLET ORAL at 08:40

## 2019-01-01 RX ADMIN — METOPROLOL TARTRATE 25 MG: 25 TABLET ORAL at 08:36

## 2019-01-01 RX ADMIN — SUCRALFATE 1 G: 1 TABLET ORAL at 12:59

## 2019-01-01 RX ADMIN — PREGABALIN 150 MG: 150 CAPSULE ORAL at 20:42

## 2019-01-01 RX ADMIN — SODIUM CHLORIDE, PRESERVATIVE FREE 10 ML: 5 INJECTION INTRAVENOUS at 20:43

## 2019-01-01 RX ADMIN — LEFLUNOMIDE 10 MG: 10 TABLET ORAL at 20:43

## 2019-01-01 RX ADMIN — SIMVASTATIN 20 MG: 20 TABLET, FILM COATED ORAL at 08:35

## 2019-01-01 RX ADMIN — PREGABALIN 150 MG: 150 CAPSULE ORAL at 08:40

## 2019-01-01 RX ADMIN — OXYBUTYNIN CHLORIDE 5 MG: 5 TABLET ORAL at 08:40

## 2019-01-01 RX ADMIN — SUCRALFATE 1 G: 1 TABLET ORAL at 15:21

## 2019-01-01 RX ADMIN — CALCIUM CARBONATE (ANTACID) CHEW TAB 500 MG 500 MG: 500 CHEW TAB at 08:40

## 2019-01-01 RX ADMIN — LEFLUNOMIDE 10 MG: 10 TABLET ORAL at 08:35

## 2019-01-01 RX ADMIN — PREGABALIN 150 MG: 150 CAPSULE ORAL at 15:21

## 2019-01-01 RX ADMIN — OXYBUTYNIN CHLORIDE 5 MG: 5 TABLET ORAL at 23:25

## 2019-01-01 RX ADMIN — SODIUM CHLORIDE, PRESERVATIVE FREE 10 ML: 5 INJECTION INTRAVENOUS at 08:42

## 2019-01-01 RX ADMIN — OXYBUTYNIN CHLORIDE 5 MG: 5 TABLET ORAL at 21:34

## 2019-01-01 RX ADMIN — OXYCODONE HYDROCHLORIDE 2.5 MG: 5 TABLET ORAL at 22:58

## 2019-01-01 RX ADMIN — SODIUM CHLORIDE, PRESERVATIVE FREE 10 ML: 5 INJECTION INTRAVENOUS at 13:03

## 2019-01-01 RX ADMIN — SODIUM CHLORIDE, PRESERVATIVE FREE 10 ML: 5 INJECTION INTRAVENOUS at 08:36

## 2019-01-01 RX ADMIN — ZONISAMIDE 200 MG: 100 CAPSULE ORAL at 21:34

## 2019-01-01 RX ADMIN — SODIUM CHLORIDE, PRESERVATIVE FREE 10 ML: 5 INJECTION INTRAVENOUS at 23:26

## 2019-01-01 RX ADMIN — ZONISAMIDE 200 MG: 100 CAPSULE ORAL at 23:24

## 2019-01-01 RX ADMIN — CALCIUM CARBONATE (ANTACID) CHEW TAB 500 MG 500 MG: 500 CHEW TAB at 09:36

## 2019-01-01 RX ADMIN — ACETAMINOPHEN 650 MG: 325 TABLET, FILM COATED ORAL at 00:02

## 2019-01-01 RX ADMIN — SUCRALFATE 1 G: 1 TABLET ORAL at 08:36

## 2019-01-01 RX ADMIN — LEVOTHYROXINE SODIUM 50 MCG: 50 TABLET ORAL at 06:12

## 2019-01-01 RX ADMIN — OXYCODONE HYDROCHLORIDE AND ACETAMINOPHEN 1 TABLET: 5; 325 TABLET ORAL at 22:25

## 2019-01-01 RX ADMIN — CALCIUM CARBONATE (ANTACID) CHEW TAB 500 MG 500 MG: 500 CHEW TAB at 20:43

## 2019-01-01 RX ADMIN — SUCRALFATE 1 G: 1 TABLET ORAL at 20:43

## 2019-01-01 RX ADMIN — MELOXICAM 15 MG: 7.5 TABLET ORAL at 08:41

## 2019-01-01 RX ADMIN — ASPIRIN 81 MG: 81 TABLET, COATED ORAL at 08:35

## 2019-01-01 RX ADMIN — METOPROLOL TARTRATE 25 MG: 25 TABLET ORAL at 09:36

## 2019-01-01 RX ADMIN — SODIUM CHLORIDE, PRESERVATIVE FREE 10 ML: 5 INJECTION INTRAVENOUS at 10:23

## 2019-01-01 RX ADMIN — ZONISAMIDE 200 MG: 100 CAPSULE ORAL at 21:11

## 2019-01-01 RX ADMIN — CITALOPRAM 20 MG: 20 TABLET, FILM COATED ORAL at 09:37

## 2019-01-01 RX ADMIN — SODIUM CHLORIDE, PRESERVATIVE FREE 10 ML: 5 INJECTION INTRAVENOUS at 09:38

## 2019-01-01 RX ADMIN — PANTOPRAZOLE SODIUM 40 MG: 40 TABLET, DELAYED RELEASE ORAL at 06:09

## 2019-01-01 RX ADMIN — MELOXICAM 15 MG: 7.5 TABLET ORAL at 08:36

## 2019-01-01 RX ADMIN — PANTOPRAZOLE SODIUM 40 MG: 40 TABLET, DELAYED RELEASE ORAL at 06:23

## 2019-01-01 RX ADMIN — OXYBUTYNIN CHLORIDE 5 MG: 5 TABLET ORAL at 21:09

## 2019-01-01 RX ADMIN — OXYCODONE HYDROCHLORIDE AND ACETAMINOPHEN 1 TABLET: 5; 325 TABLET ORAL at 06:23

## 2019-01-01 RX ADMIN — CALCIUM CARBONATE (ANTACID) CHEW TAB 500 MG 500 MG: 500 CHEW TAB at 21:34

## 2019-01-01 RX ADMIN — PREGABALIN 150 MG: 150 CAPSULE ORAL at 08:35

## 2019-01-01 RX ADMIN — LEFLUNOMIDE 10 MG: 10 TABLET ORAL at 21:09

## 2019-01-01 RX ADMIN — OXYBUTYNIN CHLORIDE 5 MG: 5 TABLET ORAL at 20:43

## 2019-01-01 RX ADMIN — CITALOPRAM 20 MG: 20 TABLET, FILM COATED ORAL at 08:40

## 2019-01-01 RX ADMIN — OXYBUTYNIN CHLORIDE 5 MG: 5 TABLET ORAL at 08:37

## 2019-01-01 RX ADMIN — LEFLUNOMIDE 10 MG: 10 TABLET ORAL at 09:37

## 2019-01-01 RX ADMIN — ZONISAMIDE 200 MG: 100 CAPSULE ORAL at 20:43

## 2019-01-01 ASSESSMENT — PAIN DESCRIPTION - PAIN TYPE
TYPE: CHRONIC PAIN
TYPE: ACUTE PAIN
TYPE: ACUTE PAIN
TYPE: CHRONIC PAIN
TYPE: CHRONIC PAIN
TYPE: ACUTE PAIN
TYPE: ACUTE PAIN
TYPE: CHRONIC PAIN
TYPE: CHRONIC PAIN

## 2019-01-01 ASSESSMENT — PAIN DESCRIPTION - ONSET
ONSET: ON-GOING

## 2019-01-01 ASSESSMENT — PAIN SCALES - GENERAL
PAINLEVEL_OUTOF10: 7
PAINLEVEL_OUTOF10: 3
PAINLEVEL_OUTOF10: 9
PAINLEVEL_OUTOF10: 8
PAINLEVEL_OUTOF10: 10
PAINLEVEL_OUTOF10: 0
PAINLEVEL_OUTOF10: 10
PAINLEVEL_OUTOF10: 8
PAINLEVEL_OUTOF10: 10
PAINLEVEL_OUTOF10: 10
PAINLEVEL_OUTOF10: 8
PAINLEVEL_OUTOF10: 9
PAINLEVEL_OUTOF10: 10
PAINLEVEL_OUTOF10: 0
PAINLEVEL_OUTOF10: 9
PAINLEVEL_OUTOF10: 0
PAINLEVEL_OUTOF10: 8
PAINLEVEL_OUTOF10: 5
PAINLEVEL_OUTOF10: 8
PAINLEVEL_OUTOF10: 10
PAINLEVEL_OUTOF10: 9
PAINLEVEL_OUTOF10: 6
PAINLEVEL_OUTOF10: 3
PAINLEVEL_OUTOF10: 3
PAINLEVEL_OUTOF10: 8

## 2019-01-01 ASSESSMENT — PAIN DESCRIPTION - PROGRESSION
CLINICAL_PROGRESSION: NOT CHANGED

## 2019-01-01 ASSESSMENT — PAIN DESCRIPTION - ORIENTATION
ORIENTATION: RIGHT;LEFT

## 2019-01-01 ASSESSMENT — PAIN DESCRIPTION - FREQUENCY
FREQUENCY: INTERMITTENT
FREQUENCY: CONTINUOUS
FREQUENCY: INTERMITTENT
FREQUENCY: INTERMITTENT
FREQUENCY: CONTINUOUS
FREQUENCY: CONTINUOUS
FREQUENCY: INTERMITTENT
FREQUENCY: CONTINUOUS
FREQUENCY: CONTINUOUS

## 2019-01-01 ASSESSMENT — PAIN DESCRIPTION - DESCRIPTORS
DESCRIPTORS: ACHING;DISCOMFORT
DESCRIPTORS: TINGLING;NUMBNESS
DESCRIPTORS: ACHING;CONSTANT;DISCOMFORT
DESCRIPTORS: ACHING;CONSTANT;DISCOMFORT
DESCRIPTORS: NUMBNESS;TINGLING
DESCRIPTORS: ACHING;CONSTANT;DISCOMFORT
DESCRIPTORS: ACHING;CONSTANT;DISCOMFORT
DESCRIPTORS: NUMBNESS;TINGLING
DESCRIPTORS: NUMBNESS;TINGLING

## 2019-01-01 ASSESSMENT — ENCOUNTER SYMPTOMS
SINUS PRESSURE: 0
ALLERGIC/IMMUNOLOGIC NEGATIVE: 1
SHORTNESS OF BREATH: 0
BACK PAIN: 1
COUGH: 0
EYE REDNESS: 0
ABDOMINAL DISTENTION: 0
COUGH: 0
DIARRHEA: 0
HEMATOCHEZIA: 0
EYE DISCHARGE: 0
VOMITING: 0
SHORTNESS OF BREATH: 0
SORE THROAT: 0
NAUSEA: 0
ABDOMINAL PAIN: 0
EYE PAIN: 0
DIARRHEA: 0
BACK PAIN: 1
WHEEZING: 0

## 2019-01-01 ASSESSMENT — PAIN DESCRIPTION - LOCATION
LOCATION: BACK
LOCATION: FOOT;LEG
LOCATION: FOOT;LEG
LOCATION: BACK
LOCATION: LEG;FOOT
LOCATION: BACK
LOCATION: FOOT;LEG

## 2019-01-01 ASSESSMENT — PAIN - FUNCTIONAL ASSESSMENT
PAIN_FUNCTIONAL_ASSESSMENT: PREVENTS OR INTERFERES SOME ACTIVE ACTIVITIES AND ADLS
PAIN_FUNCTIONAL_ASSESSMENT: PREVENTS OR INTERFERES WITH ALL ACTIVE AND SOME PASSIVE ACTIVITIES
PAIN_FUNCTIONAL_ASSESSMENT: PREVENTS OR INTERFERES SOME ACTIVE ACTIVITIES AND ADLS

## 2019-01-01 ASSESSMENT — PAIN SCALES - WONG BAKER
WONGBAKER_NUMERICALRESPONSE: 0
WONGBAKER_NUMERICALRESPONSE: 0

## 2019-01-15 PROCEDURE — 84145 PROCALCITONIN (PCT): CPT

## 2019-01-16 ENCOUNTER — HOSPITAL ENCOUNTER (OUTPATIENT)
Age: 74
Discharge: HOME | End: 2019-01-18

## 2019-01-16 LAB — PROCALCITONIN: 26.52 NG/ML (ref 0–0.08)

## 2019-11-07 PROBLEM — R76.8 RED BLOOD CELL ANTIBODY POSITIVE: Chronic | Status: ACTIVE | Noted: 2019-01-01

## 2019-12-09 PROBLEM — R26.2 UNABLE TO AMBULATE: Status: ACTIVE | Noted: 2019-01-01

## 2019-12-09 NOTE — ED NOTES
Bed: HBB  Expected date:   Expected time:   Means of arrival:   Comments:  EMS     Rj Murphy RN  12/09/19 2558

## 2019-12-09 NOTE — ED PROVIDER NOTES
This is 70years old female who was brought to the emergency room with a chief complaint of not feeling good and also pins and needle sensation in her lower legs over past several weeks has been worse in the past several days. Patient has any trauma. Patient denies any other complaints. No vomiting. No fever. No chest pain or shortness of breath. No headache or dizziness. No weakness. No slurred speech or blurred vision. Patient lives at home with her boyfriend. The history is provided by the patient and the EMS personnel. No  was used. Fatigue   Severity:  Moderate  Onset quality:  Gradual  Timing:  Constant  Progression:  Waxing and waning  Chronicity:  Recurrent  Context: not alcohol use, not allergies, not change in medication, not decreased sleep, not dehydration, not drug use, not increased activity, not pinched nerve, not recent infection, not stress and not urinary tract infection    Relieved by:  Nothing  Worsened by:  Nothing  Ineffective treatments:  None tried  Associated symptoms: arthralgias and difficulty walking    Associated symptoms: no abdominal pain, no anorexia, no aphasia, no ataxia, no chest pain, no cough, no diarrhea, no dizziness, no drooling, no dysphagia, no dysuria, no numbness in extremities, no falls, no foul-smelling urine, no frequency, no headaches, no hematochezia, no lethargy, no loss of consciousness, no near-syncope, no seizures, no shortness of breath, no stroke symptoms and no syncope    Risk factors: no anemia, no congestive heart failure, no coronary artery disease, no diabetes, no excessive menstruation, no family hx of stroke and no heart disease         Review of Systems   Constitutional: Positive for fatigue. HENT: Negative. Negative for drooling. Respiratory: Negative for cough and shortness of breath. Cardiovascular: Negative for chest pain, syncope and near-syncope.    Gastrointestinal: Negative for abdominal pain, anorexia, diarrhea, dysphagia and hematochezia. Endocrine: Negative. Genitourinary: Negative. Negative for dysuria and frequency. Musculoskeletal: Positive for arthralgias, back pain and gait problem. Negative for falls. Allergic/Immunologic: Negative. Neurological: Negative for dizziness, seizures, loss of consciousness and headaches. All other systems reviewed and are negative. Physical Exam  Vitals signs and nursing note reviewed. Constitutional:       General: She is in acute distress. Appearance: Normal appearance. She is not ill-appearing, toxic-appearing or diaphoretic. Comments: Malnourished. HENT:      Head: Normocephalic and atraumatic. Nose: Nose normal. No congestion or rhinorrhea. Eyes:      General: No scleral icterus. Right eye: No discharge. Left eye: No discharge. Extraocular Movements: Extraocular movements intact. Conjunctiva/sclera: Conjunctivae normal.   Neck:      Musculoskeletal: Normal range of motion and neck supple. No neck rigidity or muscular tenderness. Cardiovascular:      Rate and Rhythm: Normal rate and regular rhythm. Pulses: Normal pulses. Heart sounds: Normal heart sounds. Pulmonary:      Effort: Pulmonary effort is normal. No respiratory distress. Breath sounds: Normal breath sounds. No stridor. No wheezing, rhonchi or rales. Chest:      Chest wall: No tenderness. Abdominal:      General: Abdomen is flat. Bowel sounds are normal. There is no distension. Palpations: Abdomen is soft. Tenderness: There is no tenderness. There is no guarding. Musculoskeletal: Normal range of motion. General: Tenderness present. No swelling, deformity or signs of injury. Right lower leg: No edema. Left lower leg: No edema. Comments: Positive mild to moderate tenderness upon palpation lumbar spine and bilateral legs. No signs of edema. No signs of calf tenderness. Neurovascular intact. Skin:     General: Skin is warm and dry. Coloration: Skin is not jaundiced or pale. Findings: No bruising or erythema. Neurological:      General: No focal deficit present. Mental Status: She is alert. Mental status is at baseline. She is disoriented. Procedures     MDM  Number of Diagnoses or Management Options  General weakness: new and requires workup  Unable to ambulate: new and requires workup  Diagnosis management comments: Diagnoses include lumbar spine sprain, sprain, herniated disc, neuropathy, radiculopathy, sepsis, UTI, TIA, CVA. Amount and/or Complexity of Data Reviewed  Clinical lab tests: ordered and reviewed  Tests in the radiology section of CPT®: ordered and reviewed  Tests in the medicine section of CPT®: reviewed and ordered  Discuss the patient with other providers: yes (Case discussed with . Will admit the patient.)  Independent visualization of images, tracings, or specimens: yes (EKG was done at 3:29 PM.  Normal sinus rhythm. Rate of 76. Left axis deviation. No STEMI. No acute ST-T elevation. No old EKG available for comparison.)    Risk of Complications, Morbidity, and/or Mortality  Presenting problems: high  Diagnostic procedures: high  Management options: high  General comments: She remained stable throughout her course of treatment. Labs were unremarkable, except for mild leukocytosis. However patient urine is negative. CAT scan of the head was negative. Case discussed with Dr. Marisa Villarreal. Since patient cannot take care of herself at home and is unable to walk due to her condition decision was made to admit the patient's.   Will admit patient to [address] [place] [address]  [id number].                  --------------------------------------------- PAST HISTORY ---------------------------------------------  Past Medical History:  has a past medical history of Acid reflux, Anesthesia complication, Arthritis, Asthma, Cancer ([facility] [address] Depression, Fast heart beat, 2.0 - 12.0 %    Eosinophils % 0.7 0.0 - 6.0 %    Basophils % 0.2 0.0 - 2.0 %    Neutrophils Absolute 13.04 (H) 1.80 - 7.30 E9/L    Immature Granulocytes # 0.10 E9/L    Lymphocytes Absolute 1.77 1.50 - 4.00 E9/L    Monocytes Absolute 1.20 (H) 0.10 - 0.95 E9/L    Eosinophils Absolute 0.11 0.05 - 0.50 E9/L    Basophils Absolute 0.03 0.00 - 0.20 E9/L   Comprehensive Metabolic Panel w/ Reflex to MG   Result Value Ref Range    Sodium 131 (L) 132 - 146 mmol/L    Potassium reflex Magnesium 3.6 3.5 - 5.0 mmol/L    Chloride 98 98 - 107 mmol/L    CO2 21 (L) 22 - 29 mmol/L    Anion Gap 12 7 - 16 mmol/L    Glucose 75 74 - 99 mg/dL    BUN 14 8 - 23 mg/dL    CREATININE 0.8 0.5 - 1.0 mg/dL    GFR Non-African American >60 >=60 mL/min/1.73    GFR African American >60     Calcium 8.7 8.6 - 10.2 mg/dL    Total Protein 6.8 6.4 - 8.3 g/dL    Alb 3.3 (L) 3.5 - 5.2 g/dL    Total Bilirubin 0.3 0.0 - 1.2 mg/dL    Alkaline Phosphatase 283 (H) 35 - 104 U/L    ALT 14 0 - 32 U/L    AST 19 0 - 31 U/L   Troponin   Result Value Ref Range    Troponin <0.01 0.00 - 0.03 ng/mL   Lipase   Result Value Ref Range    Lipase 17 13 - 60 U/L   Protime-INR   Result Value Ref Range    Protime 11.7 9.3 - 12.4 sec    INR 1.0    APTT   Result Value Ref Range    aPTT 28.3 24.5 - 35.1 sec   Urinalysis, reflex to microscopic   Result Value Ref Range    Color, UA Yellow Straw/Yellow    Clarity, UA Clear Clear    Glucose, Ur Negative Negative mg/dL    Bilirubin Urine Negative Negative    Ketones, Urine Negative Negative mg/dL    Specific Gravity, UA 1.015 1.005 - 1.030    Blood, Urine Negative Negative    pH, UA 7.5 5.0 - 9.0    Protein,  (A) Negative mg/dL    Urobilinogen, Urine 0.2 <2.0 E.U./dL    Nitrite, Urine Negative Negative    Leukocyte Esterase, Urine Negative Negative   Lactate, Sepsis   Result Value Ref Range    Lactic Acid, Sepsis 0.7 0.5 - 1.9 mmol/L   Microscopic Urinalysis   Result Value Ref Range    WBC, UA NONE 0 - 5 /HPF    RBC, UA NONE 0 - 2 /HPF    Bacteria, UA NONE /HPF       RADIOLOGY:  CT Head WO Contrast   Final Result   Atrophy and chronic microvascular ischemic disease. Mucus retention   cyst in the left maxillary sinus. CT Lumbar Spine WO Contrast   Final Result      1. No acute fracture or dislocation. 2. Severe degenerative spondylotic changes as noted. 3. High-grade spinal canalicular stenoses posterior to L3 and   posterior to L4 related to calcification of the posterior longitudinal   ligament. 4. Additional observations as outlined above. XR CHEST PORTABLE   Final Result      Chronic obstructive pulmonary disease      No evidence of airspace consolidation or pulmonary venous congestion             EKG:  This EKG is signed and interpreted by me. EKG was done at 3:29 PM.  Normal sinus rhythm. Rate of 76. Left axis deviation. No STEMI. No acute ST-T elevation. No old EKG available for comparison. ------------------------- NURSING NOTES AND VITALS REVIEWED ---------------------------  Date / Time Roomed:  12/9/2019  2:22 PM  ED Bed Assignment:  KAROLINE BENAVIDES/UMA    The nursing notes within the ED encounter and vital signs as below have been reviewed. Patient Vitals for the past 24 hrs:   BP Temp Pulse Resp SpO2 Weight   12/09/19 1435 111/76 -- -- 16 95 % --   12/09/19 1429 -- 97.9 °F (36.6 °C) 85 -- -- 76 lb (34.5 kg)       Oxygen Saturation Interpretation: Normal    ------------------------------------------ PROGRESS NOTES ------------------------------------------  Re-evaluation(s):  Time: 5:45 pm  Patients symptoms show no change  Repeat physical examination is not changed    Counseling:  I have spoken with the patient and discussed todays results, in addition to providing specific details for the plan of care and counseling regarding the diagnosis and prognosis.   Their questions are answered at this time and they are agreeable with the plan of admission.    --------------------------------- ADDITIONAL PROVIDER NOTES ---------------------------------  Consultations:  Time: 5:45 pm. Spoke with Dr. Madhu Anaya. Discussed case. They will admit the patient. This patient's ED course included: a personal history and physicial examination and re-evaluation prior to disposition    This patient has remained hemodynamically stable during their ED course. Diagnosis:  1. Unable to ambulate    2. General weakness        Disposition:  Patient's disposition: Admit to med/surg floor  Patient's condition is stable.                 Sylvia Bermudez,   12/09/19 1750

## 2019-12-10 PROBLEM — F11.90 CHRONIC, CONTINUOUS USE OF OPIOIDS: Chronic | Status: ACTIVE | Noted: 2019-01-01

## 2019-12-10 PROBLEM — Z79.891 CHRONIC USE OF OPIATE DRUG FOR THERAPEUTIC PURPOSE: Status: ACTIVE | Noted: 2019-01-01

## 2019-12-10 PROBLEM — M48.061 LUMBAR SPINAL STENOSIS: Status: ACTIVE | Noted: 2019-01-01

## 2019-12-10 PROBLEM — F11.90 CHRONIC, CONTINUOUS USE OF OPIOIDS: Status: ACTIVE | Noted: 2019-01-01

## 2019-12-10 PROBLEM — F41.9 ANXIETY: Status: ACTIVE | Noted: 2019-01-01

## 2019-12-10 NOTE — PLAN OF CARE
PT HAS BEEN REMINDED THROUGHOUT THE MORNING TO TURN AND CHANGE POSITIONS AND TO AVOID LYING SUPINE TO KEEP PRESSURE OFF HER COCCYX.

## 2019-12-10 NOTE — CARE COORDINATION
Sw. Transition plan of care:  Sw. Met with pt at bedside. Pt reports she resides in an apt. With her boyfriend. She lives on the second floor but access to an elevator. PCP is Dr. Tiffani Reyes. Pt reports Hx of [facility]\" and HX of [facility]. Pt had cane, wheeled walker and wheel chair at home. Pt Choiced [facility] options 1. "[facility] 2. [employer] [facility]. Pt accepted at "[facility] [employer]. Hens completed and in envelope on the soft chart. SETH Deleon states she will follow for PT/OT to start auth.

## 2019-12-10 NOTE — PLAN OF CARE
Problem: Falls - Risk of:  Goal: Will remain free from falls  Description  Will remain free from falls  Outcome: Met This Shift     Problem: Falls - Risk of:  Goal: Absence of physical injury  Description  Absence of physical injury  Outcome: Met This Shift     Problem: Injury - Risk of, Physical Injury:  Goal: Will remain free from falls  Description  Will remain free from falls  Outcome: Met This Shift     Problem: Injury - Risk of, Physical Injury:  Goal: Absence of physical injury  Description  Absence of physical injury  Outcome: Met This Shift

## 2019-12-10 NOTE — H&P
[address] [facility]  History and Physical      CHIEF COMPLAINT: Leg tingling      HISTORY OF PRESENT ILLNESS:      The patient is a 69 y.o. female patient of Dr. Maggy Holly who presents with leg tingling. This is been progressive for several weeks. No exacerbation relief. Patient also notes leg weakness. This is been worsening over the same timeframe. Patient has a history of chronic back pain. She tells me this is her sciatica. She is on chronic opiate pain medications. No chest pain or trouble breathing. No vomiting or diarrhea.     Past Medical History:    Past Medical History:   Diagnosis Date    Acid reflux     Anesthesia complication     states was given \"shot for prednisone, it caused eyes to burn\" \"I don't want that anesthesiologist again'    Arthritis     R A, OSTEO    Asthma     STABLE, NO RECENT EPISODES    Cancer ([address] 1994    breast LEFT    Depression     STABLE ON MEDS    Fast heart beat     denies recent problems, lst vs Dr Claude Gunther 04/2013, on chart    Fibromyalgia     Fracture 9/2012    RIGHT HEEL    Frequency of urination     Headache(784.0)     Hyperlipidemia     Hypertension     CONTROLLED    Hypothyroidism     Irritable bowel syndrome     Osteoporosis     Rheumatoid arthritis ([address]     Wound discharge 2012; 2013     RIGHT HEEL ASHLEY Pleitez ON; NO INFECTION OR ISOLATION       Past Surgical History:    Past Surgical History:   Procedure Laterality Date    APPENDECTOMY      BREAST SURGERY Left     lumpectomy, lymph nodes    CHOLECYSTECTOMY      FOOT SURGERY  9/24/12    FOOT SURGERY  4/19/13    flap closure of dehissed wound right heel/ calcaneous debredment    FRACTURE SURGERY      rt femur fracture    KNEE ARTHROSCOPY      RIGHT    LAPAROSCOPY      cysts on ovaries    NERVE BLOCK Left 10 28 14    lumbar med branch #1    NERVE BLOCK Left 11/4/2014    left lumbar medial branch block  #2    NERVE BLOCK  11/11/14    medial branch block icterus. No conjunctival injection. Normal lips, teeth, and gums. No nasal discharge. Neck:  Supple  Heart:  RRR, no murmurs, gallops, rubs  Lungs:  CTA bilaterally, bilat symmetrical expansion, no wheeze, rales, or rhonchi  Abdomen:   Bowel sounds present, soft, nontender, no masses, no organomegaly, no peritoneal signs  Extremities:  No clubbing, cyanosis, or edema  Skin:  Warm and dry, no open lesions or rash  Neuro: + Leg weakness cranial nerves 2-12 intact, no focal deficits  Breast: deferred  Rectal: deferred  Genitalia:  deferred    LABS:    CBC with Differential:    Lab Results   Component Value Date    WBC 12.5 12/10/2019    RBC 4.52 12/10/2019    HGB 11.9 12/10/2019    HCT 38.1 12/10/2019     12/10/2019    MCV 84.3 12/10/2019    MCH 26.3 12/10/2019    MCHC 31.2 12/10/2019    RDW 17.3 12/10/2019    SEGSPCT 87 02/06/2014    LYMPHOPCT 14.4 12/10/2019    MONOPCT 7.1 12/10/2019    BASOPCT 0.2 12/10/2019    MONOSABS 0.89 12/10/2019    LYMPHSABS 1.80 12/10/2019    EOSABS 0.12 12/10/2019    BASOSABS 0.02 12/10/2019     CMP:    Lab Results   Component Value Date     12/10/2019    K 3.8 12/10/2019     12/10/2019    CO2 18 12/10/2019    BUN 19 12/10/2019    CREATININE 0.8 12/10/2019    GFRAA >60 12/10/2019    LABGLOM >60 12/10/2019    GLUCOSE 104 12/10/2019    PROT 6.8 12/09/2019    LABALBU 3.3 12/09/2019    CALCIUM 8.6 12/10/2019    BILITOT 0.3 12/09/2019    ALKPHOS 283 12/09/2019    AST 19 12/09/2019    ALT 14 12/09/2019     Magnesium:    Lab Results   Component Value Date    MG 1.4 12/08/2015     Phosphorus:    Lab Results   Component Value Date    PHOS 2.0 12/08/2015     PT/INR:    Lab Results   Component Value Date    PROTIME 11.7 12/09/2019    INR 1.0 12/09/2019     Last 3 Troponin:    Lab Results   Component Value Date    TROPONINI <0.01 12/09/2019    TROPONINI 0.01 12/04/2015    TROPONINI <0.01 03/31/2014     U/A:    Lab Results   Component Value Date    COLORU Yellow 12/09/2019 PROTEINU 100 12/09/2019    PHUR 7.5 12/09/2019    WBCUA NONE 12/09/2019    RBCUA NONE 12/09/2019    BACTERIA NONE 12/09/2019    CLARITYU Clear 12/09/2019    SPECGRAV 1.015 12/09/2019    LEUKOCYTESUR Negative 12/09/2019    UROBILINOGEN 0.2 12/09/2019    BILIRUBINUR Negative 12/09/2019    BLOODU Negative 12/09/2019    GLUCOSEU Negative 12/09/2019     ABG:    Lab Results   Component Value Date    PH 7.390 12/04/2015    PCO2 35.3 12/04/2015    PO2 253.4 12/04/2015    HCO3 20.9 12/04/2015    BE -3.6 12/04/2015    O2SAT 98.7 12/04/2015     HgBA1c:    Lab Results   Component Value Date    LABA1C 4.8 12/08/2015     FLP:  No results found for: TRIG, HDL, LDLCALC, LDLDIRECT, LABVLDL  TSH:    Lab Results   Component Value Date    TSH 0.733 12/04/2015       CT Head WO Contrast   Final Result   Atrophy and chronic microvascular ischemic disease. Mucus retention   cyst in the left maxillary sinus. CT Lumbar Spine WO Contrast   Final Result      1. No acute fracture or dislocation. 2. Severe degenerative spondylotic changes as noted. 3. High-grade spinal canalicular stenoses posterior to L3 and   posterior to L4 related to calcification of the posterior longitudinal   ligament. 4. Additional observations as outlined above. XR CHEST PORTABLE   Final Result      Chronic obstructive pulmonary disease      No evidence of airspace consolidation or pulmonary venous congestion             ASSESSMENT:      Active Problems:    Chronic pain    Ambulatory dysfunction    Chronic, continuous use of opioids    Anxiety    Chronic use of opiate drug for therapeutic purpose    Lumbar spinal stenosis  Resolved Problems:    * No resolved hospital problems.  *      PLAN:    Admit  Pain control  CT lumbar reveals lumbar stenosis  Nsurg cs  Medications for other co morbidities cont as appropriate w dosage adjustments as necessary  PT/OT  DVT PPx  DC planning Likely need SNF        Electronically signed by Silver Kumar MD on 12/10/2019 at 12:16 PM

## 2019-12-10 NOTE — PROGRESS NOTES
Date: 12/10/2019       Patient Name: Drea Vasquez  : 1945      MRN: [id number]    PT order received and chart reviewed. Pt adamantly declined PT due to pain. Pt educated on benefits of mobilization.    Will follow up as appropriate    Corey Zelaya PT, DPT  [id number]

## 2019-12-10 NOTE — ED NOTES
Pt stated she needed to urinate. Pt moved into a room for privacy and placed on bedpan. Pt back in collazo.      Humberto Reed RN  12/09/19 1939

## 2019-12-10 NOTE — DISCHARGE INSTR - COC
Continuity of Care Form    Patient Name: Wendy Bartlett   :  1945  MRN:  [id number]    Admit date:  2019  Discharge date:  ***19    Code Status Order: Full Code   Advance Directives:   Advance Care Flowsheet Documentation     Date/Time Healthcare Directive Type of Healthcare Directive Copy in [address] [facility] [address] Agent's Name Healthcare Agent's Phone Number    [phone]  No, patient does not have an advance directive for healthcare treatment -- -- -- -- --          Admitting Physician:  Erica Ruiz MD  PCP: Francisco Lowe DO    Discharging Nurse: [facility] Unit/Room#: 7646/8960-J  [address] [facility] Unit Phone Number: ***[phone]    Emergency Contact:   Extended Emergency Contact Information  Primary Emergency Contact: [address] [facility] Phone: [phone]  Relation: Brother/Sister  Secondary Emergency Contact: [address] [facility] Phone: [phone]  Relation: Other    Past Surgical History:  Past Surgical History:   Procedure Laterality Date    APPENDECTOMY      BREAST SURGERY Left     lumpectomy, lymph nodes    CHOLECYSTECTOMY      FOOT SURGERY  12    FOOT SURGERY  13    flap closure of dehissed wound right heel/ calcaneous debredment    FRACTURE SURGERY      rt femur fracture    KNEE ARTHROSCOPY      RIGHT    LAPAROSCOPY      cysts on ovaries    NERVE BLOCK Left 10 28 14    lumbar med branch #1    NERVE BLOCK Left 2014    left lumbar medial branch block  #2    NERVE BLOCK  14    medial branch block right lumbar #1    NERVE BLOCK Right 14    lumbar right medial branch block #2    SHOULDER SURGERY      rt rotator cuff    THYROID SURGERY         Immunization History: There is no immunization history on file for this patient.     Active Problems:  Patient Active Problem List   Diagnosis Code    Displacement of lumbar intervertebral disc without myelopathy M51.26    Degeneration of lumbar or lumbosacral intervertebral disc M51.37    Displacement of cervical intervertebral disc without myelopathy M50.20    Other mononeuritis of upper limb G56.80    Shortness of breath R06.02    Lumbar spondylosis M47.816    Neural foraminal stenosis of lumbar spine M99.83    Displacement of lumbar intervertebral disc M51.26    Lumbar and sacral osteoarthritis M47.817    Hip osteoarthritis M16.9    Lumbar facet arthropathy M47.816    Fall from other slipping, tripping, or stumbling W01. 0XXA    Hypotension I95.9    Sacroiliitis (HCC) M46.1    Chronic pain G89.29    Red blood cell antibody positive R76.8    Unable to ambulate R26.2       Isolation/Infection:   Isolation          No Isolation        Patient Infection Status     None to display          Nurse Assessment:  Last Vital Signs: BP (!) 152/77   Pulse 73   Temp 98 °F (36.7 °C) (Temporal)   Resp 18   Ht 5' 1\" (1.549 m) Comment: estimated   Wt 76 lb (34.5 kg)   SpO2 93%   BMI 14.36 kg/m²     Last documented pain score (0-10 scale): Pain Level: 8  Last Weight:   Wt Readings from Last 1 Encounters:   12/09/19 76 lb (34.5 kg)     Mental Status:  oriented and alert    IV Access:  - None    Nursing Mobility/ADLs:  Walking   {[facility] NODM:253225417}  Transfer  {[facility] KHAC:930935259}  Bathing  {[facility] JASEN:570024879}  Dressing  {[facility] DLRA:116510904}  Toileting  {[facility] OPZC:121753792}  Feeding  {[facility] FFUU:005418861}  Med Admin  {[facility] GTXO:386757784}  Med Delivery   { [facility] Delivery:001173104}    Wound Care Documentation and Therapy:  Pressure Ulcer 01/29/14 Toe (Comment which one) Left Stage III #3 left 2nd toe stage 3 acq 1/20/14 (Active)   Number of days: 2140       Wound 06/20/13 Other (Comment) Foot Right WOUND #1 RIGHT POSTERIOR  HEEL, ACQUIRED 4-19-13, NONHEALING SURGICALChange to Wd #2 12/19/13 (Active)   Number of days: 2363       Incision 12/03/12 Leg Right (Active)   Number of days: 2562       Incision 09/24/13 Foot Right (Active)   Number of days: 2268       Incision 09/24/13 Abdomen Anterior;Left;Lower;Distal (Active)   Number of days: 2268       Wound 12/09/19 Coccyx Dorsal Deep Tunneling  (Active)   Wound Pressure Stage  3 12/9/2019 11:01 PM   Wound Length (cm) 4 cm 12/9/2019 11:01 PM   Wound Width (cm) 3 cm 12/9/2019 11:01 PM   Wound Depth (cm) 2 cm 12/9/2019 11:01 PM   Wound Surface Area (cm^2) 12 cm^2 12/9/2019 11:01 PM   Wound Volume (cm^3) 24 cm^3 12/9/2019 11:01 PM   Wound Assessment Clean;Dry 12/9/2019 11:01 PM   Drainage Amount None 12/9/2019 11:01 PM   Number of days: 0       Wound 12/09/19 Toe (Comment  which one) Anterior; Left (Active)   Wound Other 12/9/2019 11:01 PM   Wound Length (cm) 1 cm 12/9/2019 11:01 PM   Wound Width (cm) 1 cm 12/9/2019 11:01 PM   Wound Surface Area (cm^2) 1 cm^2 12/9/2019 11:01 PM   Number of days: 0       Wound 12/09/19 Toe (Comment  which one) Anterior;Right abrasion (Active)   Wound Other 12/9/2019 11:01 PM   Wound Length (cm) 1 cm 12/9/2019 11:01 PM   Wound Width (cm) 1 cm 12/9/2019 11:01 PM   Wound Surface Area (cm^2) 1 cm^2 12/9/2019 11:01 PM   Number of days: 0       Wound 12/09/19 Buttocks (Active)   Wound Length (cm) 4 cm 12/9/2019 11:01 PM   Wound Width (cm) 1 cm 12/9/2019 11:01 PM   Wound Surface Area (cm^2) 4 cm^2 12/9/2019 11:01 PM   Number of days: 0        Elimination:  Continence:   · Bowel: Yes  · Bladder: Yes  Urinary Catheter: None   Colostomy/Ileostomy/Ileal Conduit: No       Date of Last BM: ***12/101/9    Intake/Output Summary (Last 24 hours) at 12/10/2019 1104  Last data filed at 12/10/2019 0900  Gross per 24 hour   Intake 960 ml   Output --   Net 960 ml     I/O last 3 completed shifts: In: 600 [P.O.:600]  Out: -     Safety Concerns: At Risk for Falls    Impairments/Disabilities:      None    Nutrition Therapy:  Current Nutrition Therapy:   - Oral Diet:  General    Routes of Feeding: Oral  Liquids:  Thin Liquids  Daily Fluid Restriction: no  Last Modified Barium Swallow with Video (Video Swallowing Test): not done    Treatments at the Time of Hospital Discharge:   Respiratory Treatments: ***NONE  Oxygen Therapy:  is not on home oxygen therapy. Ventilator:    - No ventilator support    Rehab Therapies: Physical Therapy and Occupational Therapy  Weight Bearing Status/Restrictions: No weight bearing restirctions  Other Medical Equipment (for information only, NOT a DME order):  {EQUIPMENT:142882407}  Other Treatments: coccyx: clean with normal saline, apply opticel,4x4 and ABD secure with 2 inch paper tape . change daily and prn    Patient's personal belongings (please select all that are sent with patient):  {[facility] DME Belongings:756796175}    RN SIGNATURE:  {Esignature:[id number]}GABE MERRILL    CASE MANAGEMENT/SOCIAL WORK SECTION    Inpatient Status Date: ***    Readmission Risk Assessment Score:  Readmission Risk              Risk of Unplanned Readmission:        10           Discharging to Facility/ Agency   · Name: [address]   · Address:  · Phone:  · Fax:    Dialysis Facility (if applicable)   · Name:  · Address:  · Dialysis Schedule:  · Phone:  · Fax:    / signature: Electronically signed by MAYDA Angulo on 12/10/2019 at 11:04 AM      PHYSICIAN SECTION    Prognosis: {Prognosis:6316491716}    Condition at Discharge: Adina Vasquez Patient Condition:353126515}    Rehab Potential (if transferring to Rehab): {Prognosis:4245024725}    Recommended Labs or Other Treatments After Discharge: ***    Physician Certification: I certify the above information and transfer of Wendy Bartlett  is necessary for the continuing treatment of the diagnosis listed and that she requires [facility] for less 30 days.      Update Admission H&P: {[facility] DME Changes in RXSMR:687707673}    PHYSICIAN SIGNATURE:  {Esignature:[id number]}DAMEON KIM MD

## 2019-12-10 NOTE — PROGRESS NOTES
Dr. Jaime Mendez,    Your patient is on a medication that requires a renal dose adjustment. Renal Function Assessment:    Date Body Weight Scr CrCl Dialysis status   12/10/2019 34.5 kg 0.8 34 ml/min No       Pharmacy has renally dose-adjusted the following medication(s):    Date Medication Original Dosing Regimen New Dosing Regimen   12/10/2019 Enoxaparin 40 mg daily 30 mg daily           These changes were made per protocol according to the [facility] Renal Function-Based Dose Adjustments Policy    *Please note this dose may need readjusted if your patient's renal function significantly improves. Please contact pharmacy with any questions regarding these changes.

## 2019-12-11 PROBLEM — Z79.891 CHRONIC USE OF OPIATE DRUG FOR THERAPEUTIC PURPOSE: Chronic | Status: ACTIVE | Noted: 2019-01-01

## 2019-12-11 NOTE — PLAN OF CARE
Problem: Injury - Risk of, Physical Injury:  Goal: Will remain free from falls  Description  Will remain free from falls  Outcome: Ongoing     Problem: Falls - Risk of:  Goal: Will remain free from falls  Description  Will remain free from falls  Outcome: Ongoing

## 2019-12-11 NOTE — PROGRESS NOTES
Subjective:  Feeling better No CP, SOB, F, V, D, P     Objective:    /73   Pulse 65   Temp 97.9 °F (36.6 °C) (Temporal)   Resp 18   Wt 76 lb (34.5 kg)   SpO2 98%   BMI 13.90 kg/m²     24HR INTAKE/OUTPUT:      Intake/Output Summary (Last 24 hours) at 12/11/2019 1012  Last data filed at 12/10/2019 1925  Gross per 24 hour   Intake 420 ml   Output --   Net 420 ml     NAD  Heart:  RRR, no murmurs, gallops, or rubs. Lungs:  CTA bilaterally, no wheeze, rales or rhonchi  Abd: bowel sounds present, nontender, nondistended, no masses  Extrem:  No clubbing, cyanosis, or edema    Most Recent Labs  Lab Results   Component Value Date    WBC 12.5 (H) 12/10/2019    HGB 11.9 12/10/2019    HCT 38.1 12/10/2019     12/10/2019     12/10/2019    K 3.8 12/10/2019     12/10/2019    CREATININE 0.8 12/10/2019    BUN 19 12/10/2019    CO2 18 (L) 12/10/2019    GLUCOSE 104 (H) 12/10/2019    ALT 14 12/09/2019    AST 19 12/09/2019    INR 1.0 12/09/2019    TSH 0.733 12/04/2015    LABA1C 4.8 12/08/2015     No results for input(s): MG in the last 72 hours. Lab Results   Component Value Date    CALCIUM 8.6 12/10/2019    PHOS 2.0 (L) 12/08/2015        MRI LUMBAR SPINE WO CONTRAST   Final Result      1. Extensive degenerative spondylotic disease. 2. Severe and very severe canalicular encroachment extending   essentially from the L2-3 level through the L4-5 level related to   extensive epidural calcification which may relate to the posterior   longitudinal ligament. Please note, this cannot be reliably   distinguished from calcified extruded disc with abutment of adjacent   fragments at successive disc spaces. CT Head WO Contrast   Final Result   Atrophy and chronic microvascular ischemic disease. Mucus retention   cyst in the left maxillary sinus. CT Lumbar Spine WO Contrast   Final Result      1. No acute fracture or dislocation. 2. Severe degenerative spondylotic changes as noted.    3. High-grade spinal canalicular stenoses posterior to L3 and   posterior to L4 related to calcification of the posterior longitudinal   ligament. 4. Additional observations as outlined above. XR CHEST PORTABLE   Final Result      Chronic obstructive pulmonary disease      No evidence of airspace consolidation or pulmonary venous congestion             Assessment    Active Problems:    Chronic pain    Ambulatory dysfunction    Chronic, continuous use of opioids    Anxiety    Chronic use of opiate drug for therapeutic purpose    Lumbar spinal stenosis  Resolved Problems:    * No resolved hospital problems. *      Plan:    Admit  BCx NGTD  Urine culture NGTD  Pain control  CT lumbar reveals lumbar stenosis  MRI w lumbar stenosis  Nsurg Consult appreciated surgery recommended  Discussed with Dr. Carla Alvarez  Medications for other co morbidities cont as appropriate w dosage adjustments as necessary  PT/OT  DVT PPx  Discussed possibility of surgery. Patient states\" I'm not having surgery. \"  She told me this  on 2 separate occasions after I left the room and came back. I will discuss further tomorrow. Attempted to contact family members. No answer at either number provided. Left messages.   DC planning Likely need SNF        Electronically signed by Aarti Sinclair MD on 12/11/2019 at 10:12 AM

## 2019-12-11 NOTE — CONSULTS
[address]                  [address] [place],  [facility]                                  CONSULTATION    PATIENT NAME: Exie Bloch                   :        1945  MED REC NO:   [id number]                            ROOM:       5403  ACCOUNT NO:   [de-identified]                           ADMIT DATE: 2019  PROVIDER:     Governor Jose MD    CONSULT DATE:  2019    CHIEF COMPLAINT:  Back pain, chronic, and inability to ambulate. HISTORY OF PRESENT ILLNESS:  This is a 79-year-old female with  progressive history of back pain and difficulty with ambulation. She  also has pain radiating down to the lower extremities. The patient is  known to me. I had evaluated her for cervical stenosis and cervical  symptoms in 2015. She was treated conservatively. At that time, it  was found to have lumbar instability at the level of L4-L5 and L5-S1. The patient now has progressively worsening symptoms as far as walking  is concerned. She complains of moderately severe back pain and also  sciatica. It is hard to assess whether she has any sphincter  disturbances. PAST MEDICAL HISTORY:  Acid reflux, anesthesia complication, cancer,  depression, fibromyalgia, headache, hyperlipidemia, hypertension,  hypothyroidism, osteoporosis and rheumatoid arthritis. PAST SURGICAL HISTORY:  Appendectomy, breast surgery, cholecystectomy,  foot surgery, fracture surgery, laparoscopy, nerve blocks, shoulder  surgery, thyroid surgery. PERSONAL HISTORY:  She is allergic to FENTANYL, IODINE, LIDODERM,  DEMEROL, TOPAMAX. SOCIAL HISTORY:  Former smoker, does not use alcohol or street drug. PHYSICAL EXAMINATION:  GENERAL:  She is alert, awake, and oriented to time, place, and person. Very thin built and underweight. NEUROLOGICAL:  Speech is good. Memory is good. Cranial nerves are  grossly intact.   Hand  is equal bilaterally in the upper  extremities. She has 3 to 4/5 strength in dorsiflexion, both sides. She has about 3 to 4/5 strength in both quadriceps. She can lift her  leg off the bed. Sensations are diminished bilaterally to moderate  degree. Ankle reflexes absent bilaterally and the knee is plus/minus  bilaterally. Straight leg raising limited by 20 to 25 degrees  bilaterally. IMPRESSION:  High-grade spinal stenosis L3-L4 and spondylolisthesis  L2-L3, L3-L4, L4-L5, and L5-S1 and multiple medical comorbidities. RECOMMENDATIONS:  I discussed the option of trying epidural nerve block,  which is decompressive lumbar laminectomy and stabilization, as the  management option, she has opted for surgical consideration. We will  plan the same for either Thursday or Friday. The patient had been  advised about the procedure and possible risk and results. We will  follow along. We will discuss with Dr. Dorothy Cason for medical clearance.         Corrinne Grimes, MD    D: 12/11/2019 12:16:43       T: 12/11/2019 13:01:15     [email or web address]  Job#: [id number]     Doc#: [id number]    CC:

## 2019-12-11 NOTE — PROGRESS NOTES
Physical Therapy    Facility/Department: [facility]  Initial Assessment     Name: Su Reynolds  : 1945  MRN: [id number]    Date of Service: 2019    Evaluating PT:  Raisa Plummer, PT, DPT [id number]    Room #:  9628/8019-K  Diagnosis:  Unable to Ambulate  Lumbar spinal stenosis  PMHx:  IBS, Fibromyalgia, Hypothyroidism, HTN, Breast Cancer, RA, Osteoporosis   Precautions:  Bed Alarm, Spinal Neutral Mechanics  Procedures:  Equipment Recommended:    Pt is a poor historian, History limited due to patient behavior/lethargy. Pt states she lives in 1 floor home with significant other. Pt has no stairs to enter. Pt states she ambulates with WW or SPC independently PTA. Other equipment patient owns: [address] [place] [address]     Initial Evaluation  Date: 19 Treatment Short Term/ Long Term   Goals   AM-PAC 6 Clicks      Does pt have pain? Mild pain buttock     Bed Mobility  Rolling: ModA  Supine to sit: ModA  Sit to supine: ModA  Scooting: ModA  Rolling: Supervision  Supine to sit: Supervision  Sit to supine: Supervision  Scooting: Supervision     Transfers Sit to stand: ModA  Stand to sit: ModA  Stand pivot: ModA  Sit to stand: Supervision  Stand to sit: Supervision  Stand pivot: Supervision AAD   Ambulation    5 feet with ModA  >150 feet with Supervision AAD   Stair negotiation: ascended and descended  NT  >4 steps with single rail SBA   ROM BLE:  WFL     Strength BLE:  3-/5 grossly  Improve strength 1/3 MMT grade   Balance Sitting EOB:  SBA  Dynamic Standing:  ModA   Sitting EOB:  Independent    Dynamic Standing:  SBA AAD     Pt is A & O x (self only). \"[place]\". Disoriented to time. Sensation:  Denies numbness/tingling  Edema:  None noted    Patient education  Pt educated on role of PT    Patient response to education:   Pt verbalized understanding Pt demonstrated skill Pt requires further education in this area   x x x     Comments:  Pt received in supine agreeable to PT evaluation.  Pt educated on

## 2019-12-11 NOTE — PLAN OF CARE
PT EATING BREAKFAST. PT REFUSES TO TAKE PILLS AT THIS TIME.pT STATES \" I AM EATING\"PT HAS BEEN EATING FOR A WHILE. APPEARS SLEEPY. WAS MEDICATED ON PREVIOUS SHIF WITH ATIVAN FOR MRI.

## 2019-12-11 NOTE — PROGRESS NOTES
Occupational Therapy  OCCUPATIONAL THERAPY INITIAL EVALUATION      Date:2019  Patient Name: Ronda Rogers  MRN: [id number]  : 1945  Room: [address] [place] [address]-A    Evaluating OT: Shahrzad Olmstead. Lennox Cisneros, OTR/L #[id number]      AM-PAC Daily Activity Raw Score:   Recommended Adaptive Equipment: tbd     Diagnosis: unable to ambulate / lumbar stenosis  Surgery: foot surgery, R femur fx. , R rotator cuff shoulder surgery  Pertinent Medical History:  IBS, Fibromyalgia, Hypothyroidism, HTN, Breast Cancer, RA, Osteoporosis   Precautions:  Falls, safety with confusion and lethargy  Noted, spinal neutral mechanics   Home Living: Pt lives with SO in a one floor home  with no step(s) to enter and no rail(s); bed/bath on main floor- questionable historian   Bathroom setup: sponge bathes    Equipment owned: ww and cane and BSC  Prior Level of Function:   assists with ADLs ,  assisted with IADLs; using ww and cane for ambulation. Driving: no                           Medication Management setup      Pain Level: pain buttocks min   Cognition: A&O: stated [place] and able to state name;  Follows 0-1 step directions   Memory:  poor   Sequencing:  poor   Problem solving:  poor   Judgement/safety:  poor     Functional Assessment:   Initial Eval Status  Date: 19 Treatment Status  Date: Short Term Goals  Treatment frequency: PRN 1-3 x/week   Feeding Min A   setup   Grooming Mod A   setup    UB Dressing Mod A   SBA   LB Dressing dependent  Mod A     Bathing Max A   Min  A    Toileting dependent  Min A   Bed Mobility  Supine to sit: mod A    Sit to supine: mod A  Supine to sit: min A    Sit to supine: min A    Functional Transfers Sit to stand: mod A   Stand to sit:  Mod A   Stand pivot: mod A   SBA with ww   Functional Mobility n/t  SBA with ww   Balance Sitting:     Static:  Min A with noted kyphosis and forward head flexion    Dynamic:mod A   Standing: mod A      Activity Tolerance Poor limited by fatigue   Fair+ Visual/  Perceptual  none decreased          Safety poor                 fair     Hand dominance: R   UE ROM: RUE: grossly limited in flexion in end range   LUE: grossly limited in flexion in end range    Strength: RUE:  4-/5 LUE:  4-/5   Strength: fair- B  Fine Motor Coordination: fair B     Hearing: WFL  Sensation:  No c/o numbness or tingling  Tone: increase in flexor tone  Edema: none noted                            Comments/Treatment: Upon arrival, patient supine and agreeable to evaluation. OT evaluation performed with  Education on benefits of mobility. Patient requested to get up and use BSC. Performed LE dressing, bed mobility,toileting and grooming tasks . At end of session, patient supine with HOB elevated and  with call light and phone within reach, all lines and tubes intact. Nursing notified. Skilled O2 monitoring performed throughout evaluation. OT for functional assessment of  ADL, Functional Transfer/Mobility Training, Equipment Needs, Energy Conservation Techniques, Pt/Family Education, Ther Ex- deep breathing for edema and pain control., OT role and POC reviewed. Patient  demo fair understanding of functional limitations and safety. Pt would benefit from continued skilled OT to increase functional independence and quality of life.     Eval Complexity: mod    Assessment of current deficits   Functional mobility [x]  ADLs [x] Strength []  Cognition [x]  Functional transfers  [x] IADLs [x] Safety Awareness [x]  Endurance [x]  Fine Motor Coordination [] Balance [x] Vision/perception [] Sensation []   Gross Motor Coordination [x] ROM [] Delirium []                  Motor Control []    Plan of Care:   ADL retraining [x]   Equipment needs [x]   Neuromuscular re-education [] Energy Conservation Techniques [x]  Functional Transfer training [x] Patient and/or Family Education [x]  Functional Mobility training [x]  Environmental Modifications [x]  Cognitive re-training []   Compensatory techniques for ADLs [x]  Splinting Needs []   Positioning to improve overall function [x]   Therapeutic Activity [x]  Therapeutic Exercise  [x]  Visual/Perceptual: []    Delirium prevention/treatment  []   Other:  []    Rehab Potential: Good for established goals    Patient / Family Goal: unable to state     Evaluation time includes thorough review of current medical information, gathering information on past medical & social history & PLOF, completion of standardized testing, informal observation of tasks, consultation with other medical professions/disciplines, assessment of data & development of POC/goals. Patient and/or family were instructed diagnosis, prognosis/goals and plan of care. yes Demonstrated fair understanding. [] Malnutrition indicators have been identified and nursing has been notified to ensure a dietitian consult is ordered. OT Evaluation + 20  treatment minutes  Tx. Time in: 9:45  Tx. Time out: Benita Ernst 72, Alf 70

## 2019-12-11 NOTE — PLAN OF CARE
Problem: Falls - Risk of:  Goal: Will remain free from falls  Description  Will remain free from falls  Outcome: Ongoing     Problem: Confusion - Acute:  Goal: Absence of continued neurological deterioration signs and symptoms  Description  Absence of continued neurological deterioration signs and symptoms  Outcome: Ongoing

## 2019-12-12 PROBLEM — E43 SEVERE PROTEIN-CALORIE MALNUTRITION (HCC): Chronic | Status: ACTIVE | Noted: 2019-01-01

## 2019-12-12 NOTE — PROGRESS NOTES
to sit:  Mod A   Stand pivot: mod A  Min A x 2- sit<->stand  Cuing for hand placement    SBA with ww   Functional Mobility n/t  Min A x 2  Home distance with HHA SBA with ww   Balance Sitting:     Static:  Min A with noted kyphosis and forward head flexion    Dynamic:mod A   Standing: mod A  Sitting:     Static:  SBA with noted kyphosis and forward head flexion    Dynamic:min A   Standing: Min A      Activity Tolerance Poor limited by fatigue  Poor  Fair+   Visual/  Perceptual  none decreased            Safety poor  Poor                fair       Comments: Upon arrival pt supine in bed. Pt educated on techniques to increase independence and safety during ADL's, bed mobility, and functional transfers. At end of session pt left supine in bed, call light within reach. · Pt has made fair progress towards set goals.      · Continue with current plan of care    Time in: 2:25  Time out: 2:40  Total Tx Time: 15 mins    Jarrod Chu Cha

## 2019-12-12 NOTE — PROGRESS NOTES
Discussed issues with the patient again. She wants to proceed with surgical decompression & stabilization  Risk benefits explained.   Plan for tomorrow

## 2019-12-12 NOTE — PROGRESS NOTES
Subjective:  Feeling better No CP, SOB, F, V, D, P     Objective:    BP (!) 162/85   Pulse 79   Temp 98.9 °F (37.2 °C) (Temporal)   Resp 18   Wt 76 lb (34.5 kg)   SpO2 94%   BMI 13.90 kg/m²     24HR INTAKE/OUTPUT:      Intake/Output Summary (Last 24 hours) at 12/12/2019 1112  Last data filed at 12/12/2019 0553  Gross per 24 hour   Intake 120 ml   Output --   Net 120 ml     NAD  Heart:  RRR, no murmurs, gallops, or rubs. Lungs:  CTA bilaterally, no wheeze, rales or rhonchi  Abd: bowel sounds present, nontender, nondistended, no masses  Extrem:  No clubbing, cyanosis, or edema    Most Recent Labs  Lab Results   Component Value Date    WBC 12.5 (H) 12/10/2019    HGB 11.9 12/10/2019    HCT 38.1 12/10/2019     12/10/2019     12/10/2019    K 3.8 12/10/2019     12/10/2019    CREATININE 0.8 12/10/2019    BUN 19 12/10/2019    CO2 18 (L) 12/10/2019    GLUCOSE 104 (H) 12/10/2019    ALT 14 12/09/2019    AST 19 12/09/2019    INR 1.0 12/09/2019    TSH 0.733 12/04/2015    LABA1C 4.8 12/08/2015     No results for input(s): MG in the last 72 hours. Lab Results   Component Value Date    CALCIUM 8.6 12/10/2019    PHOS 2.0 (L) 12/08/2015        MRI LUMBAR SPINE WO CONTRAST   Final Result      1. Extensive degenerative spondylotic disease. 2. Severe and very severe canalicular encroachment extending   essentially from the L2-3 level through the L4-5 level related to   extensive epidural calcification which may relate to the posterior   longitudinal ligament. Please note, this cannot be reliably   distinguished from calcified extruded disc with abutment of adjacent   fragments at successive disc spaces. CT Head WO Contrast   Final Result   Atrophy and chronic microvascular ischemic disease. Mucus retention   cyst in the left maxillary sinus. CT Lumbar Spine WO Contrast   Final Result      1. No acute fracture or dislocation. 2. Severe degenerative spondylotic changes as noted.    3. High-grade

## 2019-12-12 NOTE — PROGRESS NOTES
Updated Dr Mohinder Menendez, that patient has refused surgery after speaking to Jarocho, Skylar Freitas and Pat Lozano.

## 2019-12-12 NOTE — PLAN OF CARE
PT C/O PAIN IN LEGS. THIS WRITER ASKED PT WHEN THE NEUROSURGEON DR García 2823 ABOUT SURGERY. PT BEGAN YELLING AT THIS WRITER\" I NEVER SPOKE TO ANY DOCTOR ABOUT MY BACK WHAT ARE YOU TALKING ABOUT\"\" NO DOCTOR HAS EVER SPOKEN TO ME ABOUT MY BACK\" 0910 Ruth Larson INDICATED YESTERDAY THAT HE SPOKE WITH PT ABOUT HER C/O BACK AN HER OPTIONS FOR TREATMENT.

## 2019-12-12 NOTE — PROGRESS NOTES
The patient has decided not to have surgery on 2nd thought. I had explained to her about the procedure & Pros & Cons & had answered her questions. She wants pain management.   Will sign off

## 2019-12-12 NOTE — CARE COORDINATION
Salena. Transition plan of care:  Salena. rec'd auth From Radha Ramirez at [facility] [place]; Auth good til 12/13. Per physician note, pt having surgery ; will needed updated therapy notes.

## 2019-12-12 NOTE — PROGRESS NOTES
Physical Therapy  Facility/Department: [facility]  Daily Treatment Note  NAME: Sharon Salter  : 1945  MRN: [id number]    Date of Service: 2019   Evaluating PT:  Porfirio Chambers, PT, DPT [id number]     Room #:  6741/7404-Z  Diagnosis:  Unable to Ambulate  Lumbar spinal stenosis  PMHx:  IBS, Fibromyalgia, Hypothyroidism, HTN, Breast Cancer, RA, Osteoporosis   Precautions:  Bed Alarm, Spinal Neutral Mechanics  Procedures:  Equipment Recommended:     Pt is a poor historian, History limited due to patient behavior/lethargy. Pt states she lives in 1 floor home with significant other. Pt has no stairs to enter. Pt states she ambulates with WW or SPC independently PTA. Other equipment patient owns: [facility] or [facility]                Initial Evaluation  Date: 19 Treatment   Short Term/ Long Term   Goals   AM-PAC 6 Clicks 56/62 70/81      Does pt have pain? Mild pain buttock  c/o headache     Bed Mobility  Rolling: ModA  Supine to sit: ModA  Sit to supine: ModA  Scooting: ModA Rolling; SBA  Supine to sit: SBA  Sit to supine: Min A  Rolling: Supervision  Supine to sit: Supervision  Sit to supine: Supervision  Scooting: Supervision      Transfers Sit to stand: ModA  Stand to sit: ModA  Stand pivot: ModA Sit to stand: Min A x 2  Stand to sit: Min A x 2  Stand pivot: Min A  Sit to stand: Supervision  Stand to sit: Supervision  Stand pivot: Supervision AAD   Ambulation    5 feet with ModA 40 feet with no AD, HHA Min a x 2   >150 feet with Supervision AAD   Stair negotiation: ascended and descended  NT NT  >4 steps with single rail SBA   ROM BLE:  WFL       Strength BLE:  3-/5 grossly   Improve strength 1/3 MMT grade   Balance Sitting EOB:  SBA  Dynamic Standing:  ModA   sitting EOB: SBA  Dynamic standing: Min a x 2  Sitting EOB:  Independent     Dynamic Standing:  SBA AAD        Pt performed therapeutic exercise of the following: seated B LE LAQ and ankle ROM as fatigued.      Patient education  Pt was educated on safety, mobility and importance of OOB activity    Patient response to education:   Pt verbalized understanding Pt demonstrated skill Pt requires further education in this area   x x x     Additional Comments: Pt supine on arrival and agreeable to PT treatment with some encouragement. Pt declined to sit in bedside chair. Reported headache, however no rating given. Pt ambulated short distance in room before returning to bed. Pt was left supine with call light left by patient. Time in: 1425  Time out: 1440    Pt is making good progress toward established Physical Therapy goals. Continue with physical therapy current plan of care.     Zelma Heimlich [facility]  License Number: [id number]

## 2019-12-12 NOTE — PROGRESS NOTES
Nutrition Assessment    Type and Reason for Visit: Initial    Nutrition Recommendations: Will provide double portions of protein at each meal to provide additional calories and protein to promote healing. Nutrition Assessment: Pt admit w/ severe malnutrition w/w noted severe subcutaneous fat loss and muscle wasting, low BMI, ~50% of meals since adm. Pt at increased risk of compromise d/t stage 4 wound. Will double protein portion of meals to increase calorie and protein intake to promote healing. Malnutrition Assessment:  · Malnutrition Status: Meets the criteria for severe malnutrition  · Context: Chronic illness  · Findings of the 6 clinical characteristics of malnutrition (Minimum of 2 out of 6 clinical characteristics is required to make the diagnosis of moderate or severe Protein Calorie Malnutrition based on AND/ASPEN Guidelines):  1. Energy Intake-Less than or equal to 50% of estimated energy requirement, Unable to assess(d/t pt poor historian )    2. Weight Loss-Unable to assess(d/t no wt hx per EMR ),    3. Fat Loss-Severe subcutaneous fat loss, Orbital  4. Muscle Loss-Severe muscle mass loss, Interosseous, Temples (temporalis muscle), Clavicles (pectoralis and deltoids)  5. Fluid Accumulation-No significant fluid accumulation,    6.   Strength-Not measured    Nutrition Risk Level: High    Nutrient Needs:  · Estimated Daily Total Kcal: 1144-0758(1.3-1.4 SF)  · Estimated Daily Protein (g): 60-70(1.5-1.8 )  · Estimated Daily Total Fluid (ml/day): 1000    Nutrition Diagnosis:   · Problem: Severe malnutrition, In context of chronic illness  · Etiology: related to Pain(Inability to ambulate, weakness, fatigue- per pt )     Signs and symptoms:  as evidenced by Severe loss of subcutaneous fat, Severe muscle loss, Intake 25-50%, BMI(67% IBW, BMI 13.2)    Objective Information:  · Nutrition-Focused Physical Findings: +I/O,active bs, abd WNL, no edema present, inability to ambulate/weakness/fatigue

## 2019-12-12 NOTE — PLAN OF CARE
Problem: Risk for Impaired Skin Integrity  Goal: Tissue integrity - skin and mucous membranes  Description  Structural intactness and normal physiological function of skin and  mucous membranes.   Outcome: Met This Shift     Problem: Falls - Risk of:  Goal: Will remain free from falls  Description  Will remain free from falls  12/12/2019 0115 by Jose De Jesus Paige RN  Outcome: Met This Shift  12/11/2019 1755 by Isis Kelly RN  Outcome: Ongoing  Goal: Absence of physical injury  Description  Absence of physical injury  Outcome: Met This Shift     Problem: Confusion - Acute:  Goal: Absence of continued neurological deterioration signs and symptoms  Description  Absence of continued neurological deterioration signs and symptoms  12/12/2019 0115 by Jose De Jesus Paige RN  Outcome: Met This Shift  12/11/2019 1755 by Isis Kelly RN  Outcome: Ongoing     Problem: Injury - Risk of, Physical Injury:  Goal: Will remain free from falls  Description  Will remain free from falls  12/12/2019 0115 by Jose De Jesus Paige RN  Outcome: Met This Shift  12/11/2019 1755 by Isis Kelly RN  Outcome: Ongoing  Goal: Absence of physical injury  Description  Absence of physical injury  Outcome: Met This Shift     Problem: Sensory Perception - Impaired:  Goal: Demonstrates accurate environmental perceptions  Description  Demonstrates accurate environmental perceptions  Outcome: Met This Shift     Problem: Confusion - Acute:  Goal: Mental status will be restored to baseline  Description  Mental status will be restored to baseline  Outcome: Ongoing     Problem: Discharge Planning:  Goal: Ability to perform activities of daily living will improve  Description  Ability to perform activities of daily living will improve  Outcome: Ongoing  Goal: Participates in care planning  Description  Participates in care planning  Outcome: Ongoing     Problem: Mood - Altered:  Goal: Mood stable  Description  Mood stable  Outcome: Ongoing  Goal: Absence of

## 2019-12-12 NOTE — PLAN OF CARE
PT HAS A NON HEALING COCCYX WOUND,SHE STATES THAT SHE HAS HAD THIS WOUND FOR APPROXIMATELY A YEAR. PT STATES\"I WONT HEAL\" PT HAS BEEN REMINDED TO STAY OFF THE AREA AND TURN AT LEAST EVERY 2 HOURS.

## 2019-12-12 NOTE — PLAN OF CARE
DR Edwards Counter NOTIFIED THAT DR Thais Glass SPOKE WITH PT REGARDING SURGICAL RISKS. AND SHE DECIDED THAT SHE KNOW LONGER WANTED SURGERY AND WANTED THE PERMIT TORN UP. PT STATED TO DR Thais Glass THAT SHE JUSTS WANTS HER PAIN CONTROLLED,NO SURGERY. THIS WRITER ASKED DR HOPPER IF HE WANTED NPO AND ANS OTHER SURGICAL ORDERS DISCONTINUED. HE INDICATED TO LEAVE THE ORDERS AS IS AND HE PROBABLY WOULD BE IN TO TALK WITH HER.

## 2019-12-13 NOTE — DISCHARGE SUMMARY
Physician Discharge Summary     Patient ID:  Hasmukh Koenig  [id number]  56 y.o.  1945    Admit date: 12/9/2019    Discharge date and time:  12/13/2019    Admission Diagnoses:   Patient Active Problem List   Diagnosis    Displacement of lumbar intervertebral disc without myelopathy    Degeneration of lumbar or lumbosacral intervertebral disc    Displacement of cervical intervertebral disc without myelopathy    Other mononeuritis of upper limb    Shortness of breath    Lumbar spondylosis    Neural foraminal stenosis of lumbar spine    Displacement of lumbar intervertebral disc    Lumbar and sacral osteoarthritis    Hip osteoarthritis    Lumbar facet arthropathy    Fall from other slipping, tripping, or stumbling    Hypotension    Sacroiliitis (HCC)    Chronic pain    Red blood cell antibody positive    Ambulatory dysfunction    Anxiety    Chronic use of opiate drug for therapeutic purpose    Lumbar spinal stenosis    Severe protein-calorie malnutrition ([address]       Discharge Diagnoses: Active Problems:    Chronic pain    Ambulatory dysfunction    Anxiety    Chronic use of opiate drug for therapeutic purpose    Lumbar spinal stenosis    Severe protein-calorie malnutrition ([address]  Resolved Problems:    * No resolved hospital problems. *      Consults: IP CONSULT TO INTERNAL MEDICINE  IP CONSULT TO NEUROSURGERY  IP CONSULT TO DIETITIAN    Procedures: NA    Hospital Course: Admitted for weakness and back pain  BCx NGTD  Urine culture NGTD  Pain control  CT lumbar reveals lumbar stenosis  MRI w lumbar stenosis  Nsurg Consult appreciated surgery recommended  Discussed with Dr. Catherine Tillman  Medications for other co morbidities cont as appropriate w dosage adjustments as necessary  PT/OT  DVT PPx  Patient has been informed extensively of risk benefits and alternatives to surgery. Patient stated that she does not want to have surgery.   I have discussed this with Dr. Catherine Tillman,   Dr. Alberta Schulte appreciated -- surgery is recommended however pt continues to decline     DC planning  SNF today       Discharge Exam:  See progress note from today    Condition:  Stable    Disposition: home    Patient Instructions:   Current Discharge Medication List      START taking these medications    Details   QUEtiapine (SEROQUEL) 25 MG tablet Take 0.5 tablets by mouth every 6 hours as needed for Agitation (anxiety)  Qty: 60 tablet, Refills: 3         CONTINUE these medications which have CHANGED    Details   oxyCODONE-acetaminophen (PERCOCET) 7.5-325 MG per tablet Take 1 tablet by mouth every 8 hours as needed for Pain for up to 3 days. Qty: 6 tablet, Refills: 0    Comments: Reduce doses taken as pain becomes manageable  Associated Diagnoses: Spinal stenosis of lumbar region without neurogenic claudication         CONTINUE these medications which have NOT CHANGED    Details   meloxicam (MOBIC) 15 MG tablet Take 1 tablet by mouth daily  Qty: 30 tablet, Refills: 0      calcium carbonate (TUMS) 500 MG chewable tablet Take 1 tablet by mouth 2 times daily      acetaminophen 650 MG TABS Take 650 mg by mouth every 4 hours as needed  Qty: 120 tablet, Refills: 3      albuterol-ipratropium (COMBIVENT)  MCG/ACT inhaler Inhale 2 puffs into the lungs every 6 hours as needed for Wheezing      predniSONE (DELTASONE) 5 MG tablet Take 5 mg by mouth daily. levothyroxine (SYNTHROID) 100 MCG tablet   Take 50 mcg by mouth Daily Bring DOS      pregabalin (LYRICA) 150 MG capsule Take 150 mg by mouth three times daily. 75 mg 4 times a day and 150 mg two times daily      Albuterol Sulfate (PROAIR HFA IN) Inhale  into the lungs as needed. Instructed to bring am of surgery      aspirin 81 MG tablet Take 81 mg by mouth daily. Last dose will be 4-12-13, RESTARTED AFTER SURGERY 4-19-13      oxybutynin (DITROPAN) 5 MG tablet Take 5 mg by mouth 3 times daily. zonisamide (ZONEGRAN) 100 MG capsule Take 2 capsules by mouth 2 times daily.  2 po bid  Qty: 360 capsule, Refills: 3    Associated Diagnoses: Other mononeuritis of upper limb; Displacement of lumbar intervertebral disc without myelopathy; Degeneration of lumbar or lumbosacral intervertebral disc; Displacement of cervical intervertebral disc without myelopathy      leflunomide (ARAVA) 10 MG tablet Take 10 mg by mouth 2 times daily. alendronate (FOSAMAX) 35 MG tablet Take 35 mg by mouth every 7 days. simvastatin (ZOCOR) 20 MG tablet Take 20 mg by mouth daily. Bring DOS      omeprazole (PRILOSEC) 20 MG capsule Take 20 mg by mouth daily. Instructed to take with sip water am of procedurebring DOS, 09/24/2013      citalopram (CELEXA) 40 MG tablet Take 40 mg by mouth daily. isosorbide mononitrate (IMDUR) 30 MG CR tablet Take 30 mg by mouth daily. Instructed to take with sip water am of procedure      metoprolol (LOPRESSOR) 25 MG tablet Take 25 mg by mouth daily.  Instructed to take morning of surgery with a sip of water, 09/24/2013         STOP taking these medications       methylPREDNISolone (MEDROL, YESSI,) 4 MG tablet Comments:   Reason for Stopping:         morphine (MS CONTIN) 15 MG extended release tablet Comments:   Reason for Stopping:         morphine (MSIR) 15 MG tablet Comments:   Reason for Stopping:         cyclobenzaprine (FLEXERIL) 10 MG tablet Comments:   Reason for Stopping:         cyclobenzaprine (FLEXERIL) 10 MG tablet Comments:   Reason for Stopping:         meclizine (ANTIVERT) 12.5 MG tablet Comments:   Reason for Stopping:         LORazepam (ATIVAN) 0.5 MG tablet Comments:   Reason for Stopping:         ezetimibe (ZETIA) 10 MG tablet Comments:   Reason for Stopping:         sucralfate (CARAFATE) 1 GM tablet Comments:   Reason for Stopping:             Activity: activity as tolerated and no driving for today  Diet: regular diet    Follow-up with 1wk PCP    Note that over 30 minutes was spent in preparing discharge papers, discussing discharge with patient, staff, consultants, medication review, arranging follow up, etc.    Signed:  Thanh Tellez MD  12/13/2019  12:37 PM

## 2019-12-13 NOTE — PROGRESS NOTES
Subjective:  Feeling better No CP, SOB, F, V, D, P     Objective:    BP (!) 152/77   Pulse 73   Temp 98 °F (36.7 °C) (Temporal)   Resp 18   Ht 5' 1\" (1.549 m) Comment: estimated   Wt 76 lb (34.5 kg)   SpO2 93%   BMI 14.36 kg/m²     24HR INTAKE/OUTPUT:      Intake/Output Summary (Last 24 hours) at 12/13/2019 1059  Last data filed at 12/13/2019 9481  Gross per 24 hour   Intake 1210 ml   Output --   Net 1210 ml     NAD  Heart:  RRR, no murmurs, gallops, or rubs. Lungs:  CTA bilaterally, no wheeze, rales or rhonchi  Abd: bowel sounds present, nontender, nondistended, no masses  Extrem:  No clubbing, cyanosis, or edema    Most Recent Labs  Lab Results   Component Value Date    WBC 12.5 (H) 12/10/2019    HGB 11.9 12/10/2019    HCT 38.1 12/10/2019     12/10/2019     12/10/2019    K 3.8 12/10/2019     12/10/2019    CREATININE 0.8 12/10/2019    BUN 19 12/10/2019    CO2 18 (L) 12/10/2019    GLUCOSE 104 (H) 12/10/2019    ALT 14 12/09/2019    AST 19 12/09/2019    INR 1.0 12/09/2019    TSH 0.733 12/04/2015    LABA1C 4.8 12/08/2015     No results for input(s): MG in the last 72 hours. Lab Results   Component Value Date    CALCIUM 8.6 12/10/2019    PHOS 2.0 (L) 12/08/2015        MRI LUMBAR SPINE WO CONTRAST   Final Result      1. Extensive degenerative spondylotic disease. 2. Severe and very severe canalicular encroachment extending   essentially from the L2-3 level through the L4-5 level related to   extensive epidural calcification which may relate to the posterior   longitudinal ligament. Please note, this cannot be reliably   distinguished from calcified extruded disc with abutment of adjacent   fragments at successive disc spaces. CT Head WO Contrast   Final Result   Atrophy and chronic microvascular ischemic disease. Mucus retention   cyst in the left maxillary sinus. CT Lumbar Spine WO Contrast   Final Result      1. No acute fracture or dislocation.    2. Severe degenerative spondylotic changes as noted. 3. High-grade spinal canalicular stenoses posterior to L3 and   posterior to L4 related to calcification of the posterior longitudinal   ligament. 4. Additional observations as outlined above. XR CHEST PORTABLE   Final Result      Chronic obstructive pulmonary disease      No evidence of airspace consolidation or pulmonary venous congestion             Assessment    Active Problems:    Chronic pain    Ambulatory dysfunction    Anxiety    Chronic use of opiate drug for therapeutic purpose    Lumbar spinal stenosis    Severe protein-calorie malnutrition (HCC)  Resolved Problems:    * No resolved hospital problems. *      Plan:    Admit  BCx NGTD  Urine culture NGTD  Pain control  CT lumbar reveals lumbar stenosis  MRI w lumbar stenosis  Nsurg Consult appreciated surgery recommended  Discussed with Dr. Trini Perez  Medications for other co morbidities cont as appropriate w dosage adjustments as necessary  PT/OT  DVT PPx  Patient has been informed extensively of risk benefits and alternatives to surgery. Patient stated that she does not want to have surgery.   I have discussed this with Dr. Trini Perez,   Dr. Meron Rene second opinion is appreciated    DC planning  SNF day      Electronically signed by Lo Sosa MD on 12/13/2019 at 10:59 AM

## 2019-12-13 NOTE — PLAN OF CARE
Problem: Risk for Impaired Skin Integrity  Goal: Tissue integrity - skin and mucous membranes  Description  Structural intactness and normal physiological function of skin and  mucous membranes.   Outcome: Met This Shift     Problem: Falls - Risk of:  Goal: Will remain free from falls  Description  Will remain free from falls  Outcome: Met This Shift  Goal: Absence of physical injury  Description  Absence of physical injury  Outcome: Met This Shift     Problem: Injury - Risk of, Physical Injury:  Goal: Will remain free from falls  Description  Will remain free from falls  Outcome: Met This Shift  Goal: Absence of physical injury  Description  Absence of physical injury  Outcome: Met This Shift     Problem: Mood - Altered:  Goal: Mood stable  Description  Mood stable  Outcome: Met This Shift  Goal: Absence of abusive behavior  Description  Absence of abusive behavior  Outcome: Met This Shift     Problem: Sensory Perception - Impaired:  Goal: Demonstrations of improved sensory functioning will increase  Description  Demonstrations of improved sensory functioning will increase  Outcome: Met This Shift  Goal: Able to distinguish between reality-based and nonreality-based thinking  Description  Able to distinguish between reality-based and nonreality-based thinking  Outcome: Met This Shift     Problem: Pain:  Goal: Pain level will decrease  Description  Pain level will decrease  Outcome: Met This Shift  Goal: Control of acute pain  Description  Control of acute pain  Outcome: Met This Shift  Goal: Control of chronic pain  Description  Control of chronic pain  Outcome: Met This Shift     Problem: Neurological  Goal: Maximum potential motor/sensory/cognitive function  Outcome: Met This Shift     Problem: Skin Integrity:  Goal: Will show no infection signs and symptoms  Description  Will show no infection signs and symptoms  Outcome: Met This Shift  Goal: Absence of new skin breakdown  Description  Absence of new skin breakdown  Outcome: Met This Shift     Problem: Confusion - Acute:  Goal: Absence of continued neurological deterioration signs and symptoms  Description  Absence of continued neurological deterioration signs and symptoms  Outcome: Ongoing  Goal: Mental status will be restored to baseline  Description  Mental status will be restored to baseline  Outcome: Ongoing     Problem: Discharge Planning:  Goal: Ability to perform activities of daily living will improve  Description  Ability to perform activities of daily living will improve  Outcome: Ongoing  Goal: Participates in care planning  Description  Participates in care planning  Outcome: Ongoing     Problem: Mood - Altered:  Goal: Verbalizations of feeling emotionally comfortable while being cared for will increase  Description  Verbalizations of feeling emotionally comfortable while being cared for will increase  Outcome: Ongoing     Problem: Psychomotor Activity - Altered:  Goal: Absence of psychomotor disturbance signs and symptoms  Description  Absence of psychomotor disturbance signs and symptoms  Outcome: Ongoing     Problem: Sensory Perception - Impaired:  Goal: Decrease in sensory misperception frequency  Description  Decrease in sensory misperception frequency  Outcome: Ongoing  Goal: Able to refrain from responding to false sensory perceptions  Description  Able to refrain from responding to false sensory perceptions  Outcome: Ongoing  Goal: Demonstrates accurate environmental perceptions  Description  Demonstrates accurate environmental perceptions  Outcome: Ongoing  Goal: Able to interrupt nonreality-based thinking  Description  Able to interrupt nonreality-based thinking  Outcome: Ongoing     Problem: Sleep Pattern Disturbance:  Goal: Appears well-rested  Description  Appears well-rested  Outcome: Ongoing

## 2019-12-13 NOTE — CONSULTS
Negative  for hematuria or dysuria. HEMATOLOGIC:  Positive for easy bruising. INFECTIOUS:  Negative for any recent infections. MUSCULOSKELETAL:   Positive for back pain. PSYCHIATRIC:  Positive for some anxiety and  depression. NEUROLOGIC:  Negative for seizures or stroke. ENDOCRINE:   Negative for thyroid disorder. PHYSICAL EXAMINATION:  VITAL SIGNS:  She is currently afebrile with a T-current of 37.3 degrees  Celsius, respiratory rate 16, pulse 107, blood pressure is 169/82. GENERAL:  She is resting in bed, does not appear to be in any acute  distress, appears her stated age. HEENT:  Head is normocephalic and atraumatic. Pupils are 3 to 2 mm and  reactive. She has no drainage out of her eyes, ears, nose, or throat. SKIN:  Warm and dry. MUSCULOSKELETAL:  She has got good range of motion in her bilateral  upper and lower extremities. ABDOMEN:  Soft, nontender, and nondistended. RESPIRATORY:  She is not using any accessory muscles for respiration. NEUROLOGIC:  On the rest of her neurologic exam, she is awake, alert,  and oriented x2. Cranial nerves II through XII are intact bilaterally. Motor exam reveals 4/5 strength in her bilateral lower extremities, 5/5  strength in her bilateral upper extremities. Sensation is grossly  intact to light touch. REVIEW OF IMAGING:  She had a lumbar CT and lumbar MRI that shows that  she has got significant stenosis from L2 to L5 with evidence of  spondylolisthesis. ASSESSMENT AND PLAN:  The patient is a 62-year-old lady with significant  lumbar stenosis from L2 to L5 and spondylolisthesis. At this point in  time, she will benefit from surgical intervention, however, she states  that she does not want to have any surgery. I did advise her of risk,  benefits, and alternatives to surgery for her current condition.   I did  also offer to touch base with her daughter in [place] to update her on  this situation and the patient adamantly states that she does not want  to have any surgery and that she does not want us to contact her  daughter. I have relayed this information to Dr. Katherin Nascimento. The plan will  be to discharge her back to her facility and she can follow up with Dr. Katherin Nascimento on an outpatient basis.         Lula Harris MD    D: 12/12/2019 19:33:08       T: 12/12/2019 21:44:29     [email or web address]  Job#: [id number]     Doc#: [id number]    CC:

## 2019-12-13 NOTE — CARE COORDINATION
12/13/2019 social work transition of care planning  Sw notified that by [facility] liaison that pt will be transported at 1 pm via [facility]. Sw will notify charge nurse and pt at bedside.   Electronically signed by MAYDA Prather on 12/13/2019 at 9:11 AM

## 2020-01-01 ENCOUNTER — APPOINTMENT (OUTPATIENT)
Dept: CT IMAGING | Age: 75
DRG: 871 | End: 2020-01-01
Payer: MEDICARE

## 2020-01-01 ENCOUNTER — HOSPITAL ENCOUNTER (INPATIENT)
Age: 75
LOS: 3 days | Discharge: NURSING, REHAB OR LONG-TERM CARE | DRG: 871 | End: 2020-05-26
Attending: EMERGENCY MEDICINE | Admitting: INTERNAL MEDICINE
Payer: MEDICARE

## 2020-01-01 ENCOUNTER — APPOINTMENT (OUTPATIENT)
Dept: GENERAL RADIOLOGY | Age: 75
DRG: 871 | End: 2020-01-01
Payer: MEDICARE

## 2020-01-01 VITALS
HEIGHT: 61 IN | OXYGEN SATURATION: 98 % | RESPIRATION RATE: 16 BRPM | DIASTOLIC BLOOD PRESSURE: 93 MMHG | HEART RATE: 98 BPM | WEIGHT: 93.8 LBS | TEMPERATURE: 98.4 F | SYSTOLIC BLOOD PRESSURE: 140 MMHG | BODY MASS INDEX: 17.71 KG/M2

## 2020-01-01 LAB
ACANTHOCYTES: ABNORMAL
ADENOVIRUS BY PCR: NOT DETECTED
ALBUMIN SERPL-MCNC: 3.6 G/DL (ref 3.5–5.2)
ALP BLD-CCNC: 130 U/L (ref 35–104)
ALT SERPL-CCNC: 9 U/L (ref 0–32)
ANION GAP SERPL CALCULATED.3IONS-SCNC: 12 MMOL/L (ref 7–16)
ANION GAP SERPL CALCULATED.3IONS-SCNC: 12 MMOL/L (ref 7–16)
ANION GAP SERPL CALCULATED.3IONS-SCNC: 15 MMOL/L (ref 7–16)
ANION GAP SERPL CALCULATED.3IONS-SCNC: 17 MMOL/L (ref 7–16)
ANISOCYTOSIS: ABNORMAL
AST SERPL-CCNC: 17 U/L (ref 0–31)
BACTERIA: ABNORMAL /HPF
BASOPHILS ABSOLUTE: 0 E9/L (ref 0–0.2)
BASOPHILS ABSOLUTE: 0.01 E9/L (ref 0–0.2)
BASOPHILS ABSOLUTE: 0.01 E9/L (ref 0–0.2)
BASOPHILS ABSOLUTE: 0.03 E9/L (ref 0–0.2)
BASOPHILS RELATIVE PERCENT: 0.1 % (ref 0–2)
BASOPHILS RELATIVE PERCENT: 0.3 % (ref 0–2)
BILIRUB SERPL-MCNC: <0.2 MG/DL (ref 0–1.2)
BILIRUBIN URINE: NEGATIVE
BLOOD CULTURE, ROUTINE: NORMAL
BLOOD CULTURE, ROUTINE: NORMAL
BLOOD, URINE: ABNORMAL
BORDETELLA PARAPERTUSSIS BY PCR: NOT DETECTED
BORDETELLA PERTUSSIS BY PCR: NOT DETECTED
BUN BLDV-MCNC: 18 MG/DL (ref 8–23)
BUN BLDV-MCNC: 18 MG/DL (ref 8–23)
BUN BLDV-MCNC: 20 MG/DL (ref 8–23)
BUN BLDV-MCNC: 21 MG/DL (ref 8–23)
BURR CELLS: ABNORMAL
C-REACTIVE PROTEIN: 0.3 MG/DL (ref 0–0.4)
CALCIUM SERPL-MCNC: 7.7 MG/DL (ref 8.6–10.2)
CALCIUM SERPL-MCNC: 8.3 MG/DL (ref 8.6–10.2)
CALCIUM SERPL-MCNC: 8.3 MG/DL (ref 8.6–10.2)
CALCIUM SERPL-MCNC: 8.4 MG/DL (ref 8.6–10.2)
CHLAMYDOPHILIA PNEUMONIAE BY PCR: NOT DETECTED
CHLORIDE BLD-SCNC: 104 MMOL/L (ref 98–107)
CHLORIDE BLD-SCNC: 107 MMOL/L (ref 98–107)
CHLORIDE BLD-SCNC: 108 MMOL/L (ref 98–107)
CHLORIDE BLD-SCNC: 109 MMOL/L (ref 98–107)
CHP ED QC CHECK: NORMAL
CLARITY: ABNORMAL
CO2: 15 MMOL/L (ref 22–29)
CO2: 15 MMOL/L (ref 22–29)
CO2: 16 MMOL/L (ref 22–29)
CO2: 18 MMOL/L (ref 22–29)
COLOR: YELLOW
CORONAVIRUS 229E BY PCR: NOT DETECTED
CORONAVIRUS HKU1 BY PCR: NOT DETECTED
CORONAVIRUS NL63 BY PCR: NOT DETECTED
CORONAVIRUS OC43 BY PCR: NOT DETECTED
CREAT SERPL-MCNC: 0.8 MG/DL (ref 0.5–1)
CREAT SERPL-MCNC: 0.9 MG/DL (ref 0.5–1)
CREAT SERPL-MCNC: 1 MG/DL (ref 0.5–1)
CREAT SERPL-MCNC: 1 MG/DL (ref 0.5–1)
CULTURE, BLOOD 2: NORMAL
CULTURE, BLOOD 2: NORMAL
EKG ATRIAL RATE: 84 BPM
EKG P AXIS: 69 DEGREES
EKG P-R INTERVAL: 130 MS
EKG Q-T INTERVAL: 380 MS
EKG QRS DURATION: 78 MS
EKG QTC CALCULATION (BAZETT): 449 MS
EKG R AXIS: -41 DEGREES
EKG T AXIS: 51 DEGREES
EKG VENTRICULAR RATE: 84 BPM
EOSINOPHILS ABSOLUTE: 0 E9/L (ref 0.05–0.5)
EOSINOPHILS ABSOLUTE: 0 E9/L (ref 0.05–0.5)
EOSINOPHILS ABSOLUTE: 0.01 E9/L (ref 0.05–0.5)
EOSINOPHILS ABSOLUTE: 0.08 E9/L (ref 0.05–0.5)
EOSINOPHILS RELATIVE PERCENT: 0 % (ref 0–6)
EOSINOPHILS RELATIVE PERCENT: 0 % (ref 0–6)
EOSINOPHILS RELATIVE PERCENT: 0.1 % (ref 0–6)
EOSINOPHILS RELATIVE PERCENT: 0.8 % (ref 0–6)
EPITHELIAL CELLS, UA: ABNORMAL /HPF
GFR AFRICAN AMERICAN: >60
GFR NON-AFRICAN AMERICAN: 54 ML/MIN/1.73
GFR NON-AFRICAN AMERICAN: 54 ML/MIN/1.73
GFR NON-AFRICAN AMERICAN: >60 ML/MIN/1.73
GFR NON-AFRICAN AMERICAN: >60 ML/MIN/1.73
GLUCOSE BLD-MCNC: 103 MG/DL
GLUCOSE BLD-MCNC: 41 MG/DL (ref 74–99)
GLUCOSE BLD-MCNC: 61 MG/DL (ref 74–99)
GLUCOSE BLD-MCNC: 93 MG/DL (ref 74–99)
GLUCOSE BLD-MCNC: 97 MG/DL (ref 74–99)
GLUCOSE URINE: NEGATIVE MG/DL
HCT VFR BLD CALC: 28.6 % (ref 34–48)
HCT VFR BLD CALC: 30 % (ref 34–48)
HCT VFR BLD CALC: 32.4 % (ref 34–48)
HCT VFR BLD CALC: 32.8 % (ref 34–48)
HEMOGLOBIN: 8.5 G/DL (ref 11.5–15.5)
HEMOGLOBIN: 8.5 G/DL (ref 11.5–15.5)
HEMOGLOBIN: 9.6 G/DL (ref 11.5–15.5)
HEMOGLOBIN: 9.8 G/DL (ref 11.5–15.5)
HUMAN METAPNEUMOVIRUS BY PCR: NOT DETECTED
HUMAN RHINOVIRUS/ENTEROVIRUS BY PCR: NOT DETECTED
HYPOCHROMIA: ABNORMAL
IMMATURE GRANULOCYTES #: 0.09 E9/L
IMMATURE GRANULOCYTES #: 0.1 E9/L
IMMATURE GRANULOCYTES #: 0.1 E9/L
IMMATURE GRANULOCYTES %: 0.9 % (ref 0–5)
IMMATURE GRANULOCYTES %: 1 % (ref 0–5)
IMMATURE GRANULOCYTES %: 1 % (ref 0–5)
INFLUENZA A BY PCR: NOT DETECTED
INFLUENZA B BY PCR: NOT DETECTED
KETONES, URINE: NEGATIVE MG/DL
L. PNEUMOPHILA SEROGP 1 UR AG: NORMAL
LACTIC ACID, SEPSIS: 1.1 MMOL/L (ref 0.5–1.9)
LACTIC ACID: 1.4 MMOL/L (ref 0.5–2.2)
LEUKOCYTE ESTERASE, URINE: ABNORMAL
LYMPHOCYTES ABSOLUTE: 0.89 E9/L (ref 1.5–4)
LYMPHOCYTES ABSOLUTE: 1 E9/L (ref 1.5–4)
LYMPHOCYTES ABSOLUTE: 2.25 E9/L (ref 1.5–4)
LYMPHOCYTES ABSOLUTE: 2.36 E9/L (ref 1.5–4)
LYMPHOCYTES RELATIVE PERCENT: 10.5 % (ref 20–42)
LYMPHOCYTES RELATIVE PERCENT: 21.9 % (ref 20–42)
LYMPHOCYTES RELATIVE PERCENT: 23 % (ref 20–42)
LYMPHOCYTES RELATIVE PERCENT: 8.8 % (ref 20–42)
MCH RBC QN AUTO: 23.6 PG (ref 26–35)
MCH RBC QN AUTO: 24.1 PG (ref 26–35)
MCH RBC QN AUTO: 24.4 PG (ref 26–35)
MCH RBC QN AUTO: 24.6 PG (ref 26–35)
MCHC RBC AUTO-ENTMCNC: 28.3 % (ref 32–34.5)
MCHC RBC AUTO-ENTMCNC: 29.6 % (ref 32–34.5)
MCHC RBC AUTO-ENTMCNC: 29.7 % (ref 32–34.5)
MCHC RBC AUTO-ENTMCNC: 29.9 % (ref 32–34.5)
MCV RBC AUTO: 81.4 FL (ref 80–99.9)
MCV RBC AUTO: 82.2 FL (ref 80–99.9)
MCV RBC AUTO: 82.2 FL (ref 80–99.9)
MCV RBC AUTO: 83.3 FL (ref 80–99.9)
METER GLUCOSE: 103 MG/DL (ref 74–99)
MONOCYTES ABSOLUTE: 0.09 E9/L (ref 0.1–0.95)
MONOCYTES ABSOLUTE: 0.12 E9/L (ref 0.1–0.95)
MONOCYTES ABSOLUTE: 0.95 E9/L (ref 0.1–0.95)
MONOCYTES ABSOLUTE: 1.02 E9/L (ref 0.1–0.95)
MONOCYTES RELATIVE PERCENT: 0.9 % (ref 2–12)
MONOCYTES RELATIVE PERCENT: 1.2 % (ref 2–12)
MONOCYTES RELATIVE PERCENT: 9.3 % (ref 2–12)
MONOCYTES RELATIVE PERCENT: 9.9 % (ref 2–12)
MYCOPLASMA PNEUMONIAE BY PCR: NOT DETECTED
NEUTROPHILS ABSOLUTE: 6.77 E9/L (ref 1.8–7.3)
NEUTROPHILS ABSOLUTE: 6.86 E9/L (ref 1.8–7.3)
NEUTROPHILS ABSOLUTE: 8.1 E9/L (ref 1.8–7.3)
NEUTROPHILS ABSOLUTE: 9.02 E9/L (ref 1.8–7.3)
NEUTROPHILS RELATIVE PERCENT: 65.9 % (ref 43–80)
NEUTROPHILS RELATIVE PERCENT: 66.8 % (ref 43–80)
NEUTROPHILS RELATIVE PERCENT: 88.6 % (ref 43–80)
NEUTROPHILS RELATIVE PERCENT: 88.9 % (ref 43–80)
NITRITE, URINE: NEGATIVE
ORGANISM: ABNORMAL
ORGANISM: ABNORMAL
OVALOCYTES: ABNORMAL
PARAINFLUENZA VIRUS 1 BY PCR: NOT DETECTED
PARAINFLUENZA VIRUS 2 BY PCR: NOT DETECTED
PARAINFLUENZA VIRUS 3 BY PCR: NOT DETECTED
PARAINFLUENZA VIRUS 4 BY PCR: NOT DETECTED
PDW BLD-RTO: 18 FL (ref 11.5–15)
PDW BLD-RTO: 18.1 FL (ref 11.5–15)
PDW BLD-RTO: 18.6 FL (ref 11.5–15)
PDW BLD-RTO: 18.8 FL (ref 11.5–15)
PH UA: 7.5 (ref 5–9)
PLATELET # BLD: 265 E9/L (ref 130–450)
PLATELET # BLD: 265 E9/L (ref 130–450)
PLATELET # BLD: 275 E9/L (ref 130–450)
PLATELET # BLD: 302 E9/L (ref 130–450)
PMV BLD AUTO: 10 FL (ref 7–12)
PMV BLD AUTO: 9.3 FL (ref 7–12)
PMV BLD AUTO: 9.5 FL (ref 7–12)
PMV BLD AUTO: 9.8 FL (ref 7–12)
POIKILOCYTES: ABNORMAL
POLYCHROMASIA: ABNORMAL
POTASSIUM REFLEX MAGNESIUM: 4 MMOL/L (ref 3.5–5)
POTASSIUM REFLEX MAGNESIUM: 4 MMOL/L (ref 3.5–5)
POTASSIUM REFLEX MAGNESIUM: 4.3 MMOL/L (ref 3.5–5)
POTASSIUM REFLEX MAGNESIUM: 4.3 MMOL/L (ref 3.5–5)
PRO-BNP: 1944 PG/ML (ref 0–125)
PROCALCITONIN: 0.06 NG/ML (ref 0–0.08)
PROTEIN UA: 100 MG/DL
RBC # BLD: 3.48 E12/L (ref 3.5–5.5)
RBC # BLD: 3.6 E12/L (ref 3.5–5.5)
RBC # BLD: 3.98 E12/L (ref 3.5–5.5)
RBC # BLD: 3.99 E12/L (ref 3.5–5.5)
RBC UA: ABNORMAL /HPF (ref 0–2)
RESPIRATORY SYNCYTIAL VIRUS BY PCR: NOT DETECTED
SARS-COV-2, NAAT: NOT DETECTED
SCHISTOCYTES: ABNORMAL
SEDIMENTATION RATE, ERYTHROCYTE: 47 MM/HR (ref 0–20)
SODIUM BLD-SCNC: 135 MMOL/L (ref 132–146)
SODIUM BLD-SCNC: 136 MMOL/L (ref 132–146)
SODIUM BLD-SCNC: 138 MMOL/L (ref 132–146)
SODIUM BLD-SCNC: 139 MMOL/L (ref 132–146)
SPECIFIC GRAVITY UA: 1.02 (ref 1–1.03)
STREP PNEUMONIAE ANTIGEN, URINE: NORMAL
TEAR DROP CELLS: ABNORMAL
TOTAL PROTEIN: 6.7 G/DL (ref 6.4–8.3)
TROPONIN: 0.02 NG/ML (ref 0–0.03)
TROPONIN: <0.01 NG/ML (ref 0–0.03)
URINE CULTURE, ROUTINE: ABNORMAL
UROBILINOGEN, URINE: 0.2 E.U./DL
WBC # BLD: 10.1 E9/L (ref 4.5–11.5)
WBC # BLD: 10.3 E9/L (ref 4.5–11.5)
WBC # BLD: 10.3 E9/L (ref 4.5–11.5)
WBC # BLD: 9.1 E9/L (ref 4.5–11.5)
WBC UA: ABNORMAL /HPF (ref 0–5)

## 2020-01-01 PROCEDURE — 85651 RBC SED RATE NONAUTOMATED: CPT

## 2020-01-01 PROCEDURE — 36415 COLL VENOUS BLD VENIPUNCTURE: CPT

## 2020-01-01 PROCEDURE — 94669 MECHANICAL CHEST WALL OSCILL: CPT

## 2020-01-01 PROCEDURE — 86140 C-REACTIVE PROTEIN: CPT

## 2020-01-01 PROCEDURE — 2580000003 HC RX 258: Performed by: STUDENT IN AN ORGANIZED HEALTH CARE EDUCATION/TRAINING PROGRAM

## 2020-01-01 PROCEDURE — 6360000002 HC RX W HCPCS: Performed by: NURSE PRACTITIONER

## 2020-01-01 PROCEDURE — 94664 DEMO&/EVAL PT USE INHALER: CPT

## 2020-01-01 PROCEDURE — 94640 AIRWAY INHALATION TREATMENT: CPT

## 2020-01-01 PROCEDURE — 74176 CT ABD & PELVIS W/O CONTRAST: CPT

## 2020-01-01 PROCEDURE — 6370000000 HC RX 637 (ALT 250 FOR IP): Performed by: SURGERY

## 2020-01-01 PROCEDURE — 87450 HC DIRECT STREP B ANTIGEN: CPT

## 2020-01-01 PROCEDURE — 99285 EMERGENCY DEPT VISIT HI MDM: CPT

## 2020-01-01 PROCEDURE — 85025 COMPLETE CBC W/AUTO DIFF WBC: CPT

## 2020-01-01 PROCEDURE — 87077 CULTURE AEROBIC IDENTIFY: CPT

## 2020-01-01 PROCEDURE — 2580000003 HC RX 258: Performed by: NURSE PRACTITIONER

## 2020-01-01 PROCEDURE — 93005 ELECTROCARDIOGRAM TRACING: CPT | Performed by: STUDENT IN AN ORGANIZED HEALTH CARE EDUCATION/TRAINING PROGRAM

## 2020-01-01 PROCEDURE — 74177 CT ABD & PELVIS W/CONTRAST: CPT

## 2020-01-01 PROCEDURE — 1200000000 HC SEMI PRIVATE

## 2020-01-01 PROCEDURE — 2700000000 HC OXYGEN THERAPY PER DAY

## 2020-01-01 PROCEDURE — 2500000003 HC RX 250 WO HCPCS: Performed by: NURSE PRACTITIONER

## 2020-01-01 PROCEDURE — 97530 THERAPEUTIC ACTIVITIES: CPT

## 2020-01-01 PROCEDURE — 82962 GLUCOSE BLOOD TEST: CPT

## 2020-01-01 PROCEDURE — 87186 SC STD MICRODIL/AGAR DIL: CPT

## 2020-01-01 PROCEDURE — 0100U HC RESPIRPTHGN MULT REV TRANS & AMP PRB TECH 21 TRGT: CPT

## 2020-01-01 PROCEDURE — 97162 PT EVAL MOD COMPLEX 30 MIN: CPT

## 2020-01-01 PROCEDURE — U0002 COVID-19 LAB TEST NON-CDC: HCPCS

## 2020-01-01 PROCEDURE — 6360000002 HC RX W HCPCS: Performed by: STUDENT IN AN ORGANIZED HEALTH CARE EDUCATION/TRAINING PROGRAM

## 2020-01-01 PROCEDURE — 80048 BASIC METABOLIC PNL TOTAL CA: CPT

## 2020-01-01 PROCEDURE — 80053 COMPREHEN METABOLIC PANEL: CPT

## 2020-01-01 PROCEDURE — C9113 INJ PANTOPRAZOLE SODIUM, VIA: HCPCS | Performed by: NURSE PRACTITIONER

## 2020-01-01 PROCEDURE — 84484 ASSAY OF TROPONIN QUANT: CPT

## 2020-01-01 PROCEDURE — 97165 OT EVAL LOW COMPLEX 30 MIN: CPT

## 2020-01-01 PROCEDURE — 92611 MOTION FLUOROSCOPY/SWALLOW: CPT | Performed by: SPEECH-LANGUAGE PATHOLOGIST

## 2020-01-01 PROCEDURE — 87088 URINE BACTERIA CULTURE: CPT

## 2020-01-01 PROCEDURE — 70450 CT HEAD/BRAIN W/O DYE: CPT

## 2020-01-01 PROCEDURE — 6360000004 HC RX CONTRAST MEDICATION: Performed by: RADIOLOGY

## 2020-01-01 PROCEDURE — 6370000000 HC RX 637 (ALT 250 FOR IP): Performed by: INTERNAL MEDICINE

## 2020-01-01 PROCEDURE — 71260 CT THORAX DX C+: CPT

## 2020-01-01 PROCEDURE — 83880 ASSAY OF NATRIURETIC PEPTIDE: CPT

## 2020-01-01 PROCEDURE — 87040 BLOOD CULTURE FOR BACTERIA: CPT

## 2020-01-01 PROCEDURE — 99222 1ST HOSP IP/OBS MODERATE 55: CPT | Performed by: SURGERY

## 2020-01-01 PROCEDURE — 93010 ELECTROCARDIOGRAM REPORT: CPT | Performed by: INTERNAL MEDICINE

## 2020-01-01 PROCEDURE — 81001 URINALYSIS AUTO W/SCOPE: CPT

## 2020-01-01 PROCEDURE — 71045 X-RAY EXAM CHEST 1 VIEW: CPT

## 2020-01-01 PROCEDURE — 84145 PROCALCITONIN (PCT): CPT

## 2020-01-01 PROCEDURE — 6370000000 HC RX 637 (ALT 250 FOR IP): Performed by: NURSE PRACTITIONER

## 2020-01-01 PROCEDURE — 83605 ASSAY OF LACTIC ACID: CPT

## 2020-01-01 RX ORDER — SODIUM CHLORIDE AND POTASSIUM CHLORIDE .9; .15 G/100ML; G/100ML
SOLUTION INTRAVENOUS CONTINUOUS
Status: DISCONTINUED | OUTPATIENT
Start: 2020-01-01 | End: 2020-01-01 | Stop reason: HOSPADM

## 2020-01-01 RX ORDER — LEVOFLOXACIN 500 MG/1
250 TABLET, FILM COATED ORAL DAILY
Status: DISCONTINUED | OUTPATIENT
Start: 2020-01-01 | End: 2020-01-01 | Stop reason: HOSPADM

## 2020-01-01 RX ORDER — ALBUTEROL SULFATE 90 UG/1
2 AEROSOL, METERED RESPIRATORY (INHALATION) EVERY 4 HOURS PRN
Status: DISCONTINUED | OUTPATIENT
Start: 2020-01-01 | End: 2020-01-01 | Stop reason: SDUPTHER

## 2020-01-01 RX ORDER — SODIUM CHLORIDE 0.9 % (FLUSH) 0.9 %
10 SYRINGE (ML) INJECTION PRN
Status: DISCONTINUED | OUTPATIENT
Start: 2020-01-01 | End: 2020-01-01 | Stop reason: SDUPTHER

## 2020-01-01 RX ORDER — ALBUTEROL SULFATE 2.5 MG/3ML
2.5 SOLUTION RESPIRATORY (INHALATION) EVERY 4 HOURS PRN
Status: DISCONTINUED | OUTPATIENT
Start: 2020-01-01 | End: 2020-01-01 | Stop reason: HOSPADM

## 2020-01-01 RX ORDER — LEVOTHYROXINE SODIUM 0.05 MG/1
50 TABLET ORAL DAILY
Qty: 30 TABLET | Refills: 3 | Status: SHIPPED | OUTPATIENT
Start: 2020-01-01

## 2020-01-01 RX ORDER — PANTOPRAZOLE SODIUM 40 MG/1
40 TABLET, DELAYED RELEASE ORAL
Status: DISCONTINUED | OUTPATIENT
Start: 2020-01-01 | End: 2020-01-01 | Stop reason: HOSPADM

## 2020-01-01 RX ORDER — PANTOPRAZOLE SODIUM 40 MG/1
40 GRANULE, DELAYED RELEASE ORAL
COMMUNITY

## 2020-01-01 RX ORDER — SODIUM CHLORIDE 0.9 % (FLUSH) 0.9 %
10 SYRINGE (ML) INJECTION EVERY 12 HOURS SCHEDULED
Status: DISCONTINUED | OUTPATIENT
Start: 2020-01-01 | End: 2020-01-01 | Stop reason: HOSPADM

## 2020-01-01 RX ORDER — RISPERIDONE 1 MG/1
1 TABLET, FILM COATED ORAL DAILY
Status: DISCONTINUED | OUTPATIENT
Start: 2020-01-01 | End: 2020-01-01 | Stop reason: HOSPADM

## 2020-01-01 RX ORDER — LEVOTHYROXINE SODIUM 0.05 MG/1
50 TABLET ORAL DAILY
Status: DISCONTINUED | OUTPATIENT
Start: 2020-01-01 | End: 2020-01-01 | Stop reason: HOSPADM

## 2020-01-01 RX ORDER — LEVOFLOXACIN 250 MG/1
250 TABLET ORAL DAILY
Qty: 5 TABLET | Refills: 0 | Status: SHIPPED | OUTPATIENT
Start: 2020-01-01 | End: 2020-01-01

## 2020-01-01 RX ORDER — ATORVASTATIN CALCIUM 20 MG/1
20 TABLET, FILM COATED ORAL DAILY
Status: DISCONTINUED | OUTPATIENT
Start: 2020-01-01 | End: 2020-01-01 | Stop reason: HOSPADM

## 2020-01-01 RX ORDER — DOXYCYCLINE HYCLATE 100 MG/1
100 CAPSULE ORAL EVERY 12 HOURS SCHEDULED
Qty: 10 CAPSULE | Refills: 0 | Status: SHIPPED | OUTPATIENT
Start: 2020-01-01 | End: 2020-01-01

## 2020-01-01 RX ORDER — SODIUM CHLORIDE 0.9 % (FLUSH) 0.9 %
10 SYRINGE (ML) INJECTION EVERY 12 HOURS SCHEDULED
Status: DISCONTINUED | OUTPATIENT
Start: 2020-01-01 | End: 2020-01-01 | Stop reason: SDUPTHER

## 2020-01-01 RX ORDER — GENTAMICIN SULFATE 1 MG/G
CREAM TOPICAL
Qty: 1 TUBE | Refills: 0 | Status: SHIPPED | OUTPATIENT
Start: 2020-01-01

## 2020-01-01 RX ORDER — PANTOPRAZOLE SODIUM 40 MG/10ML
40 INJECTION, POWDER, LYOPHILIZED, FOR SOLUTION INTRAVENOUS DAILY
Status: DISCONTINUED | OUTPATIENT
Start: 2020-01-01 | End: 2020-01-01

## 2020-01-01 RX ORDER — RISPERIDONE 1 MG/1
1 TABLET, FILM COATED ORAL DAILY
COMMUNITY

## 2020-01-01 RX ORDER — METOPROLOL TARTRATE 5 MG/5ML
2.5 INJECTION INTRAVENOUS EVERY 8 HOURS
Status: DISCONTINUED | OUTPATIENT
Start: 2020-01-01 | End: 2020-01-01

## 2020-01-01 RX ORDER — PREDNISONE 1 MG/1
5 TABLET ORAL DAILY
Status: DISCONTINUED | OUTPATIENT
Start: 2020-01-01 | End: 2020-01-01 | Stop reason: HOSPADM

## 2020-01-01 RX ORDER — 0.9 % SODIUM CHLORIDE 0.9 %
1000 INTRAVENOUS SOLUTION INTRAVENOUS ONCE
Status: COMPLETED | OUTPATIENT
Start: 2020-01-01 | End: 2020-01-01

## 2020-01-01 RX ORDER — DIPHENHYDRAMINE HYDROCHLORIDE 50 MG/ML
25 INJECTION INTRAMUSCULAR; INTRAVENOUS ONCE
Status: COMPLETED | OUTPATIENT
Start: 2020-01-01 | End: 2020-01-01

## 2020-01-01 RX ORDER — ASPIRIN 81 MG/1
81 TABLET, CHEWABLE ORAL DAILY
Status: DISCONTINUED | OUTPATIENT
Start: 2020-01-01 | End: 2020-01-01 | Stop reason: HOSPADM

## 2020-01-01 RX ORDER — ACETAMINOPHEN 325 MG/1
650 TABLET ORAL EVERY 6 HOURS PRN
Status: DISCONTINUED | OUTPATIENT
Start: 2020-01-01 | End: 2020-01-01 | Stop reason: HOSPADM

## 2020-01-01 RX ORDER — SODIUM CHLORIDE 0.9 % (FLUSH) 0.9 %
10 SYRINGE (ML) INJECTION PRN
Status: DISCONTINUED | OUTPATIENT
Start: 2020-01-01 | End: 2020-01-01 | Stop reason: HOSPADM

## 2020-01-01 RX ORDER — BUDESONIDE 0.5 MG/2ML
0.5 INHALANT ORAL 2 TIMES DAILY
Status: DISCONTINUED | OUTPATIENT
Start: 2020-01-01 | End: 2020-01-01 | Stop reason: HOSPADM

## 2020-01-01 RX ORDER — ARFORMOTEROL TARTRATE 15 UG/2ML
15 SOLUTION RESPIRATORY (INHALATION) 2 TIMES DAILY
Status: DISCONTINUED | OUTPATIENT
Start: 2020-01-01 | End: 2020-01-01 | Stop reason: HOSPADM

## 2020-01-01 RX ORDER — ISOSORBIDE MONONITRATE 30 MG/1
30 TABLET, EXTENDED RELEASE ORAL DAILY
Status: DISCONTINUED | OUTPATIENT
Start: 2020-01-01 | End: 2020-01-01 | Stop reason: HOSPADM

## 2020-01-01 RX ORDER — ACETAMINOPHEN 650 MG/1
650 SUPPOSITORY RECTAL EVERY 6 HOURS PRN
Status: DISCONTINUED | OUTPATIENT
Start: 2020-01-01 | End: 2020-01-01 | Stop reason: HOSPADM

## 2020-01-01 RX ORDER — DOXYCYCLINE HYCLATE 100 MG/1
100 CAPSULE ORAL EVERY 12 HOURS SCHEDULED
Status: DISCONTINUED | OUTPATIENT
Start: 2020-01-01 | End: 2020-01-01 | Stop reason: HOSPADM

## 2020-01-01 RX ORDER — EZETIMIBE 10 MG/1
10 TABLET ORAL DAILY
Status: ON HOLD | COMMUNITY
End: 2020-01-01 | Stop reason: HOSPADM

## 2020-01-01 RX ORDER — QUETIAPINE FUMARATE 25 MG/1
25 TABLET, FILM COATED ORAL 2 TIMES DAILY
Qty: 60 TABLET | Refills: 3 | Status: SHIPPED | OUTPATIENT
Start: 2020-01-01

## 2020-01-01 RX ORDER — IPRATROPIUM BROMIDE AND ALBUTEROL SULFATE 2.5; .5 MG/3ML; MG/3ML
1 SOLUTION RESPIRATORY (INHALATION) EVERY 6 HOURS PRN
Status: DISCONTINUED | OUTPATIENT
Start: 2020-01-01 | End: 2020-01-01 | Stop reason: HOSPADM

## 2020-01-01 RX ORDER — GENTAMICIN SULFATE 1 MG/G
CREAM TOPICAL DAILY
Status: DISCONTINUED | OUTPATIENT
Start: 2020-01-01 | End: 2020-01-01 | Stop reason: HOSPADM

## 2020-01-01 RX ORDER — FERROUS SULFATE 325(65) MG
325 TABLET ORAL
COMMUNITY

## 2020-01-01 RX ORDER — ACETAMINOPHEN 325 MG/1
650 TABLET ORAL EVERY 4 HOURS PRN
Status: DISCONTINUED | OUTPATIENT
Start: 2020-01-01 | End: 2020-01-01 | Stop reason: HOSPADM

## 2020-01-01 RX ORDER — QUETIAPINE FUMARATE 25 MG/1
25 TABLET, FILM COATED ORAL 2 TIMES DAILY
Status: DISCONTINUED | OUTPATIENT
Start: 2020-01-01 | End: 2020-01-01 | Stop reason: HOSPADM

## 2020-01-01 RX ADMIN — PREDNISONE 5 MG: 5 TABLET ORAL at 09:23

## 2020-01-01 RX ADMIN — SODIUM CHLORIDE, PRESERVATIVE FREE 10 ML: 5 INJECTION INTRAVENOUS at 10:10

## 2020-01-01 RX ADMIN — PANTOPRAZOLE SODIUM 40 MG: 40 INJECTION, POWDER, FOR SOLUTION INTRAVENOUS at 10:10

## 2020-01-01 RX ADMIN — ARFORMOTEROL TARTRATE 15 MCG: 15 SOLUTION RESPIRATORY (INHALATION) at 05:58

## 2020-01-01 RX ADMIN — BUDESONIDE 500 MCG: 0.5 INHALANT RESPIRATORY (INHALATION) at 05:58

## 2020-01-01 RX ADMIN — ISOSORBIDE MONONITRATE 30 MG: 30 TABLET, EXTENDED RELEASE ORAL at 08:52

## 2020-01-01 RX ADMIN — SODIUM CHLORIDE AND POTASSIUM CHLORIDE: 9; 1.49 INJECTION, SOLUTION INTRAVENOUS at 10:27

## 2020-01-01 RX ADMIN — LEVOTHYROXINE SODIUM 50 MCG: 50 TABLET ORAL at 09:22

## 2020-01-01 RX ADMIN — BUDESONIDE 500 MCG: 0.5 INHALANT RESPIRATORY (INHALATION) at 16:16

## 2020-01-01 RX ADMIN — QUETIAPINE FUMARATE 25 MG: 25 TABLET ORAL at 21:13

## 2020-01-01 RX ADMIN — WATER 1 G: 1 INJECTION INTRAMUSCULAR; INTRAVENOUS; SUBCUTANEOUS at 23:05

## 2020-01-01 RX ADMIN — ACETAMINOPHEN 650 MG: 325 TABLET, FILM COATED ORAL at 21:13

## 2020-01-01 RX ADMIN — DOXYCYCLINE 100 MG: 100 INJECTION, POWDER, LYOPHILIZED, FOR SOLUTION INTRAVENOUS at 20:51

## 2020-01-01 RX ADMIN — ENOXAPARIN SODIUM 40 MG: 40 INJECTION SUBCUTANEOUS at 10:34

## 2020-01-01 RX ADMIN — PREDNISONE 5 MG: 5 TABLET ORAL at 08:52

## 2020-01-01 RX ADMIN — PANTOPRAZOLE SODIUM 40 MG: 40 TABLET, DELAYED RELEASE ORAL at 05:50

## 2020-01-01 RX ADMIN — ISOSORBIDE MONONITRATE 30 MG: 30 TABLET, EXTENDED RELEASE ORAL at 09:22

## 2020-01-01 RX ADMIN — WATER 1 G: 1 INJECTION INTRAMUSCULAR; INTRAVENOUS; SUBCUTANEOUS at 00:25

## 2020-01-01 RX ADMIN — METOPROLOL TARTRATE 25 MG: 25 TABLET, FILM COATED ORAL at 10:35

## 2020-01-01 RX ADMIN — METOPROLOL TARTRATE 25 MG: 25 TABLET, FILM COATED ORAL at 08:52

## 2020-01-01 RX ADMIN — DOXYCYCLINE HYCLATE 100 MG: 100 CAPSULE ORAL at 09:22

## 2020-01-01 RX ADMIN — ARFORMOTEROL TARTRATE 15 MCG: 15 SOLUTION RESPIRATORY (INHALATION) at 18:07

## 2020-01-01 RX ADMIN — ISOSORBIDE MONONITRATE 30 MG: 30 TABLET, EXTENDED RELEASE ORAL at 10:34

## 2020-01-01 RX ADMIN — ASPIRIN 81 MG 81 MG: 81 TABLET ORAL at 08:52

## 2020-01-01 RX ADMIN — ATORVASTATIN CALCIUM 20 MG: 20 TABLET, FILM COATED ORAL at 10:34

## 2020-01-01 RX ADMIN — METOPROLOL TARTRATE 25 MG: 25 TABLET, FILM COATED ORAL at 21:13

## 2020-01-01 RX ADMIN — ACETAMINOPHEN 650 MG: 325 TABLET, FILM COATED ORAL at 14:17

## 2020-01-01 RX ADMIN — ARFORMOTEROL TARTRATE 15 MCG: 15 SOLUTION RESPIRATORY (INHALATION) at 16:16

## 2020-01-01 RX ADMIN — BUDESONIDE 500 MCG: 0.5 INHALANT RESPIRATORY (INHALATION) at 09:59

## 2020-01-01 RX ADMIN — ASPIRIN 81 MG 81 MG: 81 TABLET ORAL at 09:22

## 2020-01-01 RX ADMIN — BUDESONIDE 500 MCG: 0.5 INHALANT RESPIRATORY (INHALATION) at 18:07

## 2020-01-01 RX ADMIN — LEVOTHYROXINE SODIUM 50 MCG: 50 TABLET ORAL at 05:50

## 2020-01-01 RX ADMIN — PANTOPRAZOLE SODIUM 40 MG: 40 TABLET, DELAYED RELEASE ORAL at 05:24

## 2020-01-01 RX ADMIN — DOXYCYCLINE HYCLATE 100 MG: 100 CAPSULE ORAL at 21:13

## 2020-01-01 RX ADMIN — SODIUM CHLORIDE, PRESERVATIVE FREE 10 ML: 5 INJECTION INTRAVENOUS at 23:31

## 2020-01-01 RX ADMIN — SODIUM CHLORIDE, PRESERVATIVE FREE 10 ML: 5 INJECTION INTRAVENOUS at 15:48

## 2020-01-01 RX ADMIN — ARFORMOTEROL TARTRATE 15 MCG: 15 SOLUTION RESPIRATORY (INHALATION) at 06:02

## 2020-01-01 RX ADMIN — RISPERIDONE 1 MG: 1 TABLET ORAL at 14:42

## 2020-01-01 RX ADMIN — DOXYCYCLINE HYCLATE 100 MG: 100 CAPSULE ORAL at 08:52

## 2020-01-01 RX ADMIN — HYDROCORTISONE SODIUM SUCCINATE 200 MG: 100 INJECTION, POWDER, FOR SOLUTION INTRAMUSCULAR; INTRAVENOUS at 09:40

## 2020-01-01 RX ADMIN — BUDESONIDE 500 MCG: 0.5 INHALANT RESPIRATORY (INHALATION) at 16:15

## 2020-01-01 RX ADMIN — WATER 1 G: 1 INJECTION INTRAMUSCULAR; INTRAVENOUS; SUBCUTANEOUS at 00:51

## 2020-01-01 RX ADMIN — SODIUM CHLORIDE AND POTASSIUM CHLORIDE: 9; 1.49 INJECTION, SOLUTION INTRAVENOUS at 19:00

## 2020-01-01 RX ADMIN — SODIUM CHLORIDE, PRESERVATIVE FREE 10 ML: 5 INJECTION INTRAVENOUS at 02:49

## 2020-01-01 RX ADMIN — ENOXAPARIN SODIUM 40 MG: 40 INJECTION SUBCUTANEOUS at 09:21

## 2020-01-01 RX ADMIN — QUETIAPINE FUMARATE 25 MG: 25 TABLET ORAL at 08:52

## 2020-01-01 RX ADMIN — ENOXAPARIN SODIUM 40 MG: 40 INJECTION SUBCUTANEOUS at 12:56

## 2020-01-01 RX ADMIN — DOXYCYCLINE HYCLATE 100 MG: 100 CAPSULE ORAL at 19:24

## 2020-01-01 RX ADMIN — RISPERIDONE 1 MG: 1 TABLET ORAL at 10:36

## 2020-01-01 RX ADMIN — IPRATROPIUM BROMIDE AND ALBUTEROL SULFATE 1 AMPULE: .5; 3 SOLUTION RESPIRATORY (INHALATION) at 16:15

## 2020-01-01 RX ADMIN — METOPROLOL TARTRATE 25 MG: 25 TABLET, FILM COATED ORAL at 09:22

## 2020-01-01 RX ADMIN — ASPIRIN 81 MG 81 MG: 81 TABLET ORAL at 10:33

## 2020-01-01 RX ADMIN — BUDESONIDE 500 MCG: 0.5 INHALANT RESPIRATORY (INHALATION) at 06:02

## 2020-01-01 RX ADMIN — LEVOFLOXACIN 250 MG: 500 TABLET, FILM COATED ORAL at 10:35

## 2020-01-01 RX ADMIN — QUETIAPINE FUMARATE 25 MG: 25 TABLET ORAL at 14:43

## 2020-01-01 RX ADMIN — SODIUM CHLORIDE, PRESERVATIVE FREE 10 ML: 5 INJECTION INTRAVENOUS at 23:00

## 2020-01-01 RX ADMIN — LEVOTHYROXINE SODIUM 50 MCG: 50 TABLET ORAL at 05:24

## 2020-01-01 RX ADMIN — RISPERIDONE 1 MG: 1 TABLET ORAL at 08:52

## 2020-01-01 RX ADMIN — ARFORMOTEROL TARTRATE 15 MCG: 15 SOLUTION RESPIRATORY (INHALATION) at 09:59

## 2020-01-01 RX ADMIN — METOROPROLOL TARTRATE 2.5 MG: 5 INJECTION, SOLUTION INTRAVENOUS at 10:34

## 2020-01-01 RX ADMIN — IOPAMIDOL 75 ML: 755 INJECTION, SOLUTION INTRAVENOUS at 23:29

## 2020-01-01 RX ADMIN — SODIUM CHLORIDE, PRESERVATIVE FREE 10 ML: 5 INJECTION INTRAVENOUS at 00:54

## 2020-01-01 RX ADMIN — SODIUM CHLORIDE 1000 ML: 9 INJECTION, SOLUTION INTRAVENOUS at 21:16

## 2020-01-01 RX ADMIN — ATORVASTATIN CALCIUM 20 MG: 20 TABLET, FILM COATED ORAL at 08:52

## 2020-01-01 RX ADMIN — GENTAMICIN SULFATE: 1 CREAM TOPICAL at 10:33

## 2020-01-01 RX ADMIN — HYDROCORTISONE SODIUM SUCCINATE 200 MG: 100 INJECTION, POWDER, FOR SOLUTION INTRAMUSCULAR; INTRAVENOUS at 15:48

## 2020-01-01 RX ADMIN — WATER 1 G: 1 INJECTION INTRAMUSCULAR; INTRAVENOUS; SUBCUTANEOUS at 00:04

## 2020-01-01 RX ADMIN — ARFORMOTEROL TARTRATE 15 MCG: 15 SOLUTION RESPIRATORY (INHALATION) at 16:15

## 2020-01-01 RX ADMIN — SODIUM CHLORIDE AND POTASSIUM CHLORIDE: 9; 1.49 INJECTION, SOLUTION INTRAVENOUS at 11:37

## 2020-01-01 RX ADMIN — ATORVASTATIN CALCIUM 20 MG: 20 TABLET, FILM COATED ORAL at 09:22

## 2020-01-01 RX ADMIN — QUETIAPINE FUMARATE 25 MG: 25 TABLET ORAL at 19:24

## 2020-01-01 RX ADMIN — GENTAMICIN SULFATE: 1 CREAM TOPICAL at 08:53

## 2020-01-01 RX ADMIN — PREDNISONE 5 MG: 5 TABLET ORAL at 10:36

## 2020-01-01 RX ADMIN — METOPROLOL TARTRATE 25 MG: 25 TABLET, FILM COATED ORAL at 19:23

## 2020-01-01 RX ADMIN — DIPHENHYDRAMINE HYDROCHLORIDE 25 MG: 50 INJECTION, SOLUTION INTRAMUSCULAR; INTRAVENOUS at 22:07

## 2020-01-01 RX ADMIN — DOXYCYCLINE HYCLATE 100 MG: 100 CAPSULE ORAL at 10:34

## 2020-01-01 RX ADMIN — SODIUM CHLORIDE AND POTASSIUM CHLORIDE: 9; 1.49 INJECTION, SOLUTION INTRAVENOUS at 02:29

## 2020-01-01 RX ADMIN — SODIUM CHLORIDE, PRESERVATIVE FREE 10 ML: 5 INJECTION INTRAVENOUS at 21:14

## 2020-01-01 RX ADMIN — SODIUM CHLORIDE AND POTASSIUM CHLORIDE: 9; 1.49 INJECTION, SOLUTION INTRAVENOUS at 08:52

## 2020-01-01 RX ADMIN — PANTOPRAZOLE SODIUM 40 MG: 40 TABLET, DELAYED RELEASE ORAL at 09:23

## 2020-01-01 RX ADMIN — METOROPROLOL TARTRATE 2.5 MG: 5 INJECTION, SOLUTION INTRAVENOUS at 02:48

## 2020-01-01 RX ADMIN — DOXYCYCLINE 100 MG: 100 INJECTION, POWDER, LYOPHILIZED, FOR SOLUTION INTRAVENOUS at 12:28

## 2020-01-01 RX ADMIN — HYDROCORTISONE SODIUM SUCCINATE 200 MG: 100 INJECTION, POWDER, FOR SOLUTION INTRAMUSCULAR; INTRAVENOUS at 22:03

## 2020-01-01 RX ADMIN — SODIUM CHLORIDE, PRESERVATIVE FREE 10 ML: 5 INJECTION INTRAVENOUS at 22:08

## 2020-01-01 RX ADMIN — METOROPROLOL TARTRATE 2.5 MG: 5 INJECTION, SOLUTION INTRAVENOUS at 19:09

## 2020-01-01 RX ADMIN — QUETIAPINE FUMARATE 25 MG: 25 TABLET ORAL at 10:36

## 2020-01-01 ASSESSMENT — PAIN SCALES - GENERAL
PAINLEVEL_OUTOF10: 10
PAINLEVEL_OUTOF10: 0
PAINLEVEL_OUTOF10: 3
PAINLEVEL_OUTOF10: 0
PAINLEVEL_OUTOF10: 0

## 2020-01-01 ASSESSMENT — PAIN SCALES - PAIN ASSESSMENT IN ADVANCED DEMENTIA (PAINAD)
FACIALEXPRESSION: 0
TOTALSCORE: 0
BREATHING: 0
BODYLANGUAGE: 0
FACIALEXPRESSION: 0
FACIALEXPRESSION: 0
BREATHING: 0
BODYLANGUAGE: 0
CONSOLABILITY: 0
BODYLANGUAGE: 0
CONSOLABILITY: 0
TOTALSCORE: 0
TOTALSCORE: 0
BREATHING: 0
NEGVOCALIZATION: 0
BODYLANGUAGE: 0
BREATHING: 0
NEGVOCALIZATION: 0
CONSOLABILITY: 0
NEGVOCALIZATION: 0
NEGVOCALIZATION: 0
FACIALEXPRESSION: 0
TOTALSCORE: 0
CONSOLABILITY: 0

## 2020-01-01 ASSESSMENT — PAIN SCALES - WONG BAKER: WONGBAKER_NUMERICALRESPONSE: 0

## 2020-05-23 PROBLEM — N39.0 UTI (URINARY TRACT INFECTION): Status: ACTIVE | Noted: 2020-01-01

## 2020-05-23 NOTE — PROGRESS NOTES
Patient's  Demar Jarquin called in and verified the patient's home medications. He also stated that she ambulates at home using a walker and at times, a cane. He is her primary caregiver.

## 2020-05-23 NOTE — ED PROVIDER NOTES
Fri May 22, 2020   2100 Attempted to contact emergency consults for more information, however was not able to get in touch with either contact. []   Sat May 23, 2020   0005 Spoke with Dr. Cierra Georges, who will admit    []      ED Course User Index  [] Nando Ochoa, DO       --------------------------------------------- PAST HISTORY ---------------------------------------------  Past Medical History:  has a past medical history of Acid reflux, Anesthesia complication, Arthritis, Asthma, Cancer ([facility] [address] Depression, Fast heart beat, Fibromyalgia, Fracture, Frequency of urination, Headache(784.0), Hyperlipidemia, Hypertension, Hypothyroidism, Irritable bowel syndrome, Osteoporosis, Rheumatoid arthritis ([address] and Wound discharge. Past Surgical History:  has a past surgical history that includes Thyroid surgery; Cholecystectomy; Appendectomy; laparoscopy; shoulder surgery; fracture surgery; Foot surgery (9/24/12); Knee arthroscopy; Foot surgery (4/19/13); Breast surgery (Left); Nerve Block (Left, 10 28 14); Nerve Block (Left, 11/4/2014); Nerve Block (11/11/14); and Nerve Block (Right, 11/18/14). Social History:  reports that she has quit smoking. Her smoking use included cigarettes. She has a 10.00 pack-year smoking history. She quit smokeless tobacco use about 9 years ago. She reports that she does not drink alcohol or use drugs. Family History: family history includes Arthritis in an other family member; Cancer in her father and mother; Depression in an other family member; Heart Disease in an other family member; High Blood Pressure in an other family member; Stroke in an other family member. The patients home medications have been reviewed. Allergies: Fentanyl; Iodine; Lidoderm;  Topamax; Demerol; Dilaudid [hydromorphone hcl]; and Methadone    -------------------------------------------------- RESULTS -------------------------------------------------    LABS:  Results for orders placed or performed Resp SpO2 Height Weight   05/22/20 2216 -- -- -- 74 22 100 % -- --   05/22/20 2215 -- -- -- 79 20 100 % -- --   05/22/20 2214 -- -- -- 77 19 100 % -- --   05/22/20 2213 (!) 166/90 -- -- 79 25 100 % -- --   05/22/20 2212 -- -- -- 83 20 100 % -- --   05/22/20 2020 (!) 196/79 98.5 °F (36.9 °C) Oral 84 20 100 % 5' 1\" (1.549 m) 75 lb (34 kg)       Oxygen Saturation Interpretation: Abnormal    ------------------------------------------ PROGRESS NOTES ------------------------------------------  Re-evaluation(s):  Time: 2300  Patients symptoms show no change  Repeat physical examination is not changed    Counseling:  I have spoken with the patient and discussed todays results, in addition to providing specific details for the plan of care and counseling regarding the diagnosis and prognosis. Their questions are answered at this time and they are agreeable with the plan of admission.    --------------------------------- ADDITIONAL PROVIDER NOTES ---------------------------------  Consultations:  Time: 0005. Spoke with Dr. Thomas Glez. Discussed case. They will admit the patient. This patient's ED course included: a personal history and physicial examination, re-evaluation prior to disposition, multiple bedside re-evaluations and IV medications    This patient has remained hemodynamically stable during their ED course. Diagnosis:  1. Acute cystitis with hematuria    2. Altered mental status, unspecified altered mental status type        Disposition:  Patient's disposition: Admit to med/surg floor  Patient's condition is fair.             Stephenie Garcia DO  Resident  05/23/20 5350

## 2020-05-23 NOTE — H&P
Department of Internal Medicine  History and Physical    PCP: Dr. Moo Robledo Physician: Dr. Miguel Cade  Consultants: Dr. Ariadne Negron, Wound Care      CHIEF COMPLAINT: Altered mental status    HISTORY OF PRESENT ILLNESS:    Chente Ricci is a 79-year-old  female who presented to [address] [place] [address] emergency department for evaluation of altered mental status. She arrived from home. Per EMS report she was initially hypoxic with saturations in the 80% range. She had not apparently been experiencing any respiratory distress and responded well to minimal supplemental nasal cannula oxygen. Currently she is usually alert and oriented at baseline but was unable to provide history secondary to the degree of altered mental status. Family was not present to provide history as well and could not be reached at that time. Emergency department evaluation revealed a urinary tract infection. Chest x-ray did not reveal any acute infectious process. The remainder the patient's emergency department evaluation was felt to be benign and she was admitted for further care and management of the encephalopathy which is felt to be secondary to the urinary tract infection. Review of the patient's most recent hospitalization from [facility] revealed a hospitalization primarily for management of her intractable low back pain. At that time, she underwent MRI evaluation of the low back which found severe disease. She received opinions from 2 separate neurosurgeons who felt she would benefit from surgery at that time but she declined. As mentioned above, the collection of HPI and review of systems information is severely limited secondary to the patient's encephalopathy and mental status as well as the inability to collect history from family at this time.     PAST MEDICAL Hx:  Past Medical History:   Diagnosis Date    Acid reflux     Anesthesia complication     states was given \"shot for prednisone, it caused eyes to every 6 hours as needed for Agitation (anxiety) 12/13/19   Yamileth Crockett MD   meloxicam CAMRON PARRISHMeredith GUADALUPE [facility]) 15 MG tablet Take 1 tablet by mouth daily 12/4/19   Rick Kay MD   calcium carbonate (TUMS) 500 MG chewable tablet Take 1 tablet by mouth 2 times daily    Historical Provider, MD   pregabalin (LYRICA) 150 MG capsule Take 150 mg by mouth three times daily. 75 mg 4 times a day and 150 mg two times daily    Historical Provider, MD   aspirin 81 MG tablet Take 81 mg by mouth daily. Last dose will be 4-12-13, RESTARTED AFTER SURGERY 4-19-13    Historical Provider, MD   alendronate (FOSAMAX) 35 MG tablet Take 35 mg by mouth every 7 days. Historical Provider, MD   omeprazole (PRILOSEC) 20 MG capsule Take 20 mg by mouth daily. Instructed to take with sip water am of procedurebring DOS, 09/24/2013    Historical Provider, MD       ALLERGIES:  Fentanyl; Iodine; Lidoderm;  Topamax; Demerol; Dilaudid [hydromorphone hcl]; and Methadone    SOCIAL Hx:  Social History     Socioeconomic History    Marital status:      Spouse name: Not on file    Number of children: Not on file    Years of education: Not on file    Highest education level: Not on file   Occupational History    Not on file   Social Needs    Financial resource strain: Not on file    Food insecurity     Worry: Not on file     Inability: Not on file    Transportation needs     Medical: Not on file     Non-medical: Not on file   Tobacco Use    Smoking status: Former Smoker     Packs/day: 0.50     Years: 20.00     Pack years: 10.00     Types: Cigarettes    Smokeless tobacco: Former User     Quit date: 10/14/2010   Substance and Sexual Activity    Alcohol use: No    Drug use: No    Sexual activity: Not on file   Lifestyle    Physical activity     Days per week: Not on file     Minutes per session: Not on file    Stress: Not on file   Relationships    Social connections     Talks on phone: Not on file     Gets together: Not on file appearing. Does not follow commands or provide verbal response. Does withdraw from painful stimuli. Nonfocal exam.    SKIN:    Sacral wound. No bruising or bleeding. No redness, warmth, or swelling    EXTREMITIES:    Peripheral pulses present. No edema, cyanosis, or swelling.     LABORATORY DATA:  CBC with Differential:    Lab Results   Component Value Date    WBC 10.1 05/22/2020    RBC 3.99 05/22/2020    HGB 9.8 05/22/2020    HCT 32.8 05/22/2020     05/22/2020    MCV 82.2 05/22/2020    MCH 24.6 05/22/2020    MCHC 29.9 05/22/2020    RDW 18.1 05/22/2020    SEGSPCT 87 02/06/2014    LYMPHOPCT 8.8 05/22/2020    MONOPCT 1.2 05/22/2020    BASOPCT 0.1 05/22/2020    MONOSABS 0.12 05/22/2020    LYMPHSABS 0.89 05/22/2020    EOSABS 0.00 05/22/2020    BASOSABS 0.01 05/22/2020     CMP:    Lab Results   Component Value Date     05/22/2020    K 4.0 05/22/2020     05/22/2020    CO2 18 05/22/2020    BUN 18 05/22/2020    CREATININE 1.0 05/22/2020    GFRAA >60 05/22/2020    LABGLOM 54 05/22/2020    GLUCOSE 103 05/22/2020    PROT 6.7 05/22/2020    LABALBU 3.6 05/22/2020    CALCIUM 7.7 05/22/2020    BILITOT <0.2 05/22/2020    ALKPHOS 130 05/22/2020    AST 17 05/22/2020    ALT 9 05/22/2020     U/A:    Lab Results   Component Value Date    COLORU Yellow 05/22/2020    PROTEINU 100 05/22/2020    PHUR 7.5 05/22/2020    WBCUA 10-20 05/22/2020    RBCUA 1-3 05/22/2020    BACTERIA FEW 05/22/2020    CLARITYU TURBID 05/22/2020    SPECGRAV 1.020 05/22/2020    LEUKOCYTESUR LARGE 05/22/2020    UROBILINOGEN 0.2 05/22/2020    BILIRUBINUR Negative 05/22/2020    BLOODU LARGE 05/22/2020    GLUCOSEU Negative 05/22/2020       ASSESSMENT:  · Sepsis secondary to urinary tract infection  · Acute respiratory failure with hypoxia   · Acute metabolic encephalopathy  · Large sacral wound  · Acute on chronic anemia, normocytic hypochromic  · Essential hypertension   · Hyperlipidemia   · Mild dehydration   · chronic pain syndrome on multiple different medications  · Severe lumbar stenosis recently recommended for surgical treatment, refused  · Severe protein calorie malnutrition  · Generalized anxiety disorder    PLAN:  Melany Calderon was admitted from [address] [place] [address] emergency department after being found to be encephalopathic which was felt to be secondary to a urinary tract infection. She was instituted on antibiotics for treatment and cultures were obtained. Despite receiving antibiotics, her sensorium is not clear today. We will continue to undertake an infectious evaluation. She was also hypoxic on admission without explanation. We will perform further chest imaging to investigate for occult pneumonia and exclude other sources. Aggressive respiratory support will be utilized as well. We will treat for the possibility of a pulmonary infection as well with Rocephin and doxycycline which should provide adequate coverage for the urinary tract infection as well. Chest CT will be undertaken as well to exclude occult infection. We will also further investigate the sacral wound with imaging, perform sed rate and CRP as well as procalcitonin and general surgery will evaluate. The encephalopathy is felt to be multifactorial as the infection could be contributing, she is also felt to be suffering from some dehydration and polypharmacy. Multiple sedating medications are being withheld. A bedside swallow will be performed before any medication will be administered p.o. Wound care will be evaluated for her protein calorie malnutrition. Chronic comorbidities were addressed as well during her hospitalization. Please see additional order for details.     ROBY Keller  7:36 AM  5/23/2020    Electronically signed by GENA Keller CNP on 5/23/20 at 7:36 AM EDT

## 2020-05-23 NOTE — PROGRESS NOTES
The patient is unable to answer questions or verify home medications. I tried to reach her  Thomas Figueroa at [phone] but no answer. I left a message for him to call the department.

## 2020-05-24 NOTE — DISCHARGE INSTR - COC
Coccyx Mid coccyx wound (Active)   Dressing Status Changed 5/23/2020 11:30 PM   Dressing Changed Changed/New 5/23/2020 11:30 PM   Dressing/Treatment Foam 5/23/2020 11:30 PM   Wound Cleansed Soap and water 5/23/2020 11:30 PM   Wound Length (cm) 3.2 cm 5/23/2020  3:33 AM   Wound Width (cm) 3.2 cm 5/23/2020  3:33 AM   Wound Depth (cm) 1 cm 5/23/2020  3:33 AM   Wound Surface Area (cm^2) 10.24 cm^2 5/23/2020  3:33 AM   Wound Volume (cm^3) 10.24 cm^3 5/23/2020  3:33 AM   Wound Assessment Drainage;Pink;Red;Yellow 5/23/2020  3:33 AM   Drainage Amount Small 5/23/2020  3:33 AM   Drainage Description Yellow 5/23/2020  3:33 AM   Odor None 5/23/2020  3:33 AM   Margins Defined edges 5/23/2020  3:33 AM   Culture Taken Yes 5/23/2020  3:33 AM   Number of days: 1        Elimination:  Continence:   · Bowel: No  · Bladder: No  Urinary Catheter: Removal Date 5/26/20   Colostomy/Ileostomy/Ileal Conduit: No       Date of Last BM: 5/25/20    Intake/Output Summary (Last 24 hours) at 5/24/2020 1038  Last data filed at 5/24/2020 0939  Gross per 24 hour   Intake 1198.75 ml   Output 1375 ml   Net -176.25 ml     I/O last 3 completed shifts: In: 1198.8 [I.V.:1098.8; IV Piggyback:100]  Out: 7261 [JYOLY:8174]    Safety Concerns: At Risk for Falls    Impairments/Disabilities:      None    Nutrition Therapy:  Current Nutrition Therapy:   - Oral Diet:  General    Routes of Feeding: Oral  Liquids:  Thin Liquids  Daily Fluid Restriction: no  Last Modified Barium Swallow with Video (Video Swallowing Test): not done    Rehab Therapies: Physical Therapy and Occupational Therapy  Weight Bearing Status/Restrictions: No weight bearing restirctions  Other Medical Equipment (for information only, NOT a DME order):  wheelchair and walker  Other Treatments: ***    Patient's personal belongings (please select all that are sent with patient):  Glasses    RN SIGNATURE:  Electronically signed by Haja Armenta RN on 5/26/20 at 10:27 AM EDT    CASE MANAGEMENT/SOCIAL WORK SECTION    Inpatient Status Date: 5-    Readmission Risk Assessment Score:  Readmission Risk              Risk of Unplanned Readmission:        17           Discharging to Facility/ Agency   · Name: [facility]  · Address: [facility]   · Phone: [phone]    Dialysis Facility (if applicable)   · Name:  · Address:  · Dialysis Schedule:  · Phone:  · Fax:    / signature: Electronically signed by Michael Sam RN on 5/24/2020 at 10:44 AM      PHYSICIAN SECTION    Prognosis: Good    Condition at Discharge: Stable    Rehab Potential (if transferring to Rehab): Good    Recommended Labs or Other Treatments After Discharge: ***    Physician Certification: I certify the above information and transfer of Jesi Sevilla  is necessary for the continuing treatment of the diagnosis listed and that she requires [place] for less 30 days.      Update Admission H&P: {CHP DME Changes in EVR:[id number]}    PHYSICIAN SIGNATURE:  Electronically signed by Freida Christy DO on 5/26/20 at 10:24 AM EDT

## 2020-05-24 NOTE — PROGRESS NOTES
Speech Language Pathology      NAME:  Porter Gottron  :  1945  DATE: 2020  ROOM:  0330/0330-02    Following evaluation, nursing entered room and asked SLP to observe medication administration. With encouragement, pt accepted whole pills in applesauce with fair-good toleration. Min holding and delay in a-p transfer observed, however no overt s/s of aspiration. Silent aspiration can not be r/o bedside. UTI (urinary tract infection) [N39.0]        Mary CAST CCC/SLP [id number]  Speech-Language Pathologist

## 2020-05-24 NOTE — PROGRESS NOTES
Internal Medicine Progress Note    Liudmila Matos. Zay Abdi., & [address] Zay Abdi., F.A.C.O.I. Kehinde Smith D.O., MALCOLM.A.C.O.I. Primary Care Physician: Kt Pelaez DO   Admitting Physician:  Anneliese Kong DO  Admission date and time: 5/22/2020  8:07 PM    Room:  [address] [place] [address]    Patient Name: Mario Rai  MRN: [id number]    Date of Service: 5/24/2020     Subjective:  Amari Heller is far more awake and alert during my examination today. We had a long conversation regarding her situation at home along with her sacral wound. She denies any overt pain or discomfort today. She admits to profound weakness and and deconditioning with inability to complete activities of daily living. We discussed the possibility for skilled nursing facility placement and she voiced agreement. CT scan of the chest as well as abdomen and pelvis have been reviewed. The surgical team has provided consultation. Review of System: HEENT:  Dick ear pain, sore throat, sinus or eye problems  Cardiovascular:   Denies any chest pain, irregular heartbeats, or palpitations. Respiratory:   Admits to very mild shortness of breath. She has no coughing or sputum production. Gastrointestinal:   Denies nausea, vomiting, diarrhea, or constipation. Denies any abdominal pain. Extremities:   Denies any lower extremity swelling or edema. Neurology:    Denies any headache or focal neurological deficits. Admits to profound weakness and deconditioning. Derm:    Denies any rashes, ulcers, or excoriations. Denies bruising. Admits to the large sacral wound. Genitourinary:    Denies any urgency, frequency, hematuria. Voiding without difficulty. Musculoskeletal:  Admits to diffuse myalgias and joint pain. Physical Exam:  No intake/output data recorded. Blood pressure (!) 182/88, pulse 93, temperature 97.7 °F (36.5 °C), temperature source Oral, resp.  rate 18, height 5' 1\" (1.549 m), weight malnutrition  11. Generalized anxiety disorder    Plan:   The patient is maintained on antibiotic therapy for coverage of both the urinary tract infection as well as a respiratory infection. CT scan of the chest as well as CT scan of the abdomen and pelvis did not reveal any acute findings. The surgical team has provided consultation secondary to the large sacral wound. No plans for debridement but with ongoing local wound care. The wound care team will also provide consultation. Our goal is to wean off the nasal cannula oxygen today. Chronic comorbidities are otherwise well controlled. As she is more awake and alert during my examination today, a diet will be instituted we will resume many of her oral home medications. In the setting of metabolic encephalopathy with complete delirium upon presentation. Many medications will be temporarily discontinued altogether. The patient requires extensive work with the therapy teams. We discussed the possibility of skilled nursing facility placement upon discharge and she voiced agreement. Continue current therapy. See orders for further plan of care. More than 50% of my time was spent at the bedside counseling/coordinating care with the patient and/or family with face to face contact. This time was spent reviewing notes and laboratory data as well as instructing and counseling the patient. Time I spent with the family or surrogate(s) is included only if the patient was incapable of providing the necessary information or participating in medical decisions. I also discussed the differential diagnosis and all of the proposed management plans with the patient and individuals accompanying the patient. I am readily available for any further decision-making and intervention.        Marian Medrano DO, F.A.C.O.I.  5/24/2020  7:36 AM

## 2020-05-24 NOTE — PLAN OF CARE
Problem: Falls - Risk of:  Goal: Will remain free from falls  Description: Will remain free from falls  Outcome: Met This Shift  Goal: Absence of physical injury  Description: Absence of physical injury  Outcome: Met This Shift     Problem: Skin Integrity:  Goal: Will show no infection signs and symptoms  Description: Will show no infection signs and symptoms  Outcome: Met This Shift  Goal: Absence of new skin breakdown  Description: Absence of new skin breakdown  Outcome: Met This Shift     Problem: SAFETY  Goal: Free from accidental physical injury  Outcome: Met This Shift  Goal: Free from intentional harm  Outcome: Met This Shift     Problem: DAILY CARE  Goal: Daily care needs are met  Outcome: Met This Shift     Problem: PAIN  Goal: Patient's pain/discomfort is manageable  Outcome: Met This Shift     Problem: SKIN INTEGRITY  Goal: Skin integrity is maintained or improved  Outcome: Met This Shift     Problem: ELIMINATION  Goal: Ability to achieve and maintain adequate urine output will improve to within specified parameters  Description: Ability to achieve and maintain adequate urine output will improve to within specified parameters     Outcome: Met This Shift

## 2020-05-25 NOTE — PROGRESS NOTES
7. Hyperlipidemia   8. Chronic pain syndrome on multiple different medications  9. Severe lumbar stenosis recently recommended for surgical treatment, refused  10. Severe protein calorie malnutrition  11. Generalized anxiety disorder    Plan:   Secondary to the patient's agitation yesterday, antipsychotic medications were added back. This has resulted in improvement in her overall disposition. Diet is being advanced accordingly. Her pain is otherwise well controlled. Aggressive wound care management is being undertaken. Antibiotic therapy is being employed for a urinary tract infection as we await final urine cultures. I anticipate transitioning to oral antibiotics tomorrow. She will continue to work with the therapy teams. She is agreeable to skilled nursing facility placement and we will move forward with COVID testing as we anticipate discharge within the next 24 to 48 hours. More than 50% of my time was spent at the bedside counseling/coordinating care with the patient and/or family with face to face contact. This time was spent reviewing notes and laboratory data as well as instructing and counseling the patient. Time I spent with the family or surrogate(s) is included only if the patient was incapable of providing the necessary information or participating in medical decisions. I also discussed the differential diagnosis and all of the proposed management plans with the patient and individuals accompanying the patient. I am readily available for any further decision-making and intervention.        Garrett Arnold DO, F.A.C.O.I.  5/25/2020  7:36 AM

## 2020-05-25 NOTE — PLAN OF CARE
Problem: Falls - Risk of:  Goal: Will remain free from falls  Description: Will remain free from falls  5/25/2020 1134 by Christiane Drew RN  Outcome: Met This Shift     Problem: Falls - Risk of:  Goal: Absence of physical injury  Description: Absence of physical injury  5/25/2020 1134 by Christiane Drew RN  Outcome: Met This Shift     Problem: Skin Integrity:  Goal: Will show no infection signs and symptoms  Description: Will show no infection signs and symptoms  5/25/2020 1134 by Christiane Drew RN  Outcome: Met This Shift     Problem: PAIN  Goal: Patient's pain/discomfort is manageable  5/25/2020 1134 by Christiane Drew RN  Outcome: Met This Shift     Problem: Safety/Fall Precautions  Goal: Knowledge - personal safety  5/25/2020 1134 by Christiane Drew RN  Outcome: Met This Shift

## 2020-05-26 NOTE — PROGRESS NOTES
OCCUPATIONAL THERAPY  Initial Evaluation  Date:2020  Patient Name: Brianda Davidson  MRN: [id number]  : 1945  ROOM #: 0330/0330-02     Referring Provider: Garrett Arnold DO  Evaluating OT: Kortney Caroet OTR/L [id number]    Placement Recommendation: Subacute    Recommended Adaptive Equipment: TBD at rehab    [facility]   AM-PAC Daily Activity Inpatient   How much help for putting on and taking off regular lower body clothing?: Total  How much help for Bathing?: A Lot  How much help for Toileting?: Total  How much help for putting on and taking off regular upper body clothing?: A Lot  How much help for taking care of personal grooming?: A Lot  How much help for eating meals?: A Little  AM-PAC Inpatient Daily Activity Raw Score: 11  AM-PAC Inpatient ADL T-Scale Score : 29.04  ADL Inpatient CMS 0-100% Score: 70.42  ADL Inpatient CMS G-Code Modifier : CL    Diagnosis:   1. Acute cystitis with hematuria    2. Altered mental status, unspecified altered mental status type      Pertinent Medical History:   Past Medical History:   Diagnosis Date    Acid reflux     Anesthesia complication     states was given \"shot for prednisone, it caused eyes to burn\" \"I don't want that anesthesiologist again'    Arthritis     R A, OSTEO    Asthma     STABLE, NO RECENT EPISODES    Cancer ([address]     breast LEFT    Depression     STABLE ON MEDS    Fast heart beat     denies recent problems, lst vs Dr Mary Coreas 2013, on chart    Fibromyalgia     Fracture 2012    RIGHT HEEL    Frequency of urination     Headache(784.0)     Hyperlipidemia     Hypertension     CONTROLLED    Hypothyroidism     Irritable bowel syndrome     Osteoporosis     Rheumatoid arthritis ([address]     Wound discharge ;      RIGHT HEEL ASHLEY [place] ON; NO INFECTION OR ISOLATION        Precautions:  falls, confusion, alarm   Pain Scale: Numeric Rate: no c/o pain; Nursing notified.     Social history: with family: \"my man\"       Drive: function [x]   Therapeutic Activity [x]  Therapeutic Exercise  []  Visual/Perceptual: []    Delirium prevention/treatment  []  Other:  []    Rehab Potential: Good for established goals developed with patient and family. Patient / Family Goal: return home      Patient and/or family were instructed on functional diagnosis, prognosis/goals and OT plan of care. Demonstrated fair understanding. Time In: 9:05am    Time Out: 9:25am                 Treatment Charges: Mins Units   ADL/Home Mgt                    [id number]     Therapeutic Activities          [id number]    Therapeutic Exercise          06000     Manual Therapy                  [id number]     Neuro Re-ed                       53096     Orthotic manage/training    73311     Total Timed Treatment 13 1     Evaluation time includes thorough review of current medical information, gathering information on past medical history/social history and prior level of function, completion of standardized testing/informal observation of tasks, assessment of data, and development of POC/Goals.     Cheryle Pair OTR/L [id number]

## 2020-05-26 NOTE — PROGRESS NOTES
Sit to supine: Maximal assist of  2   Scooting: Maximal assist of 1  Rolling: Minimal assist of 1   Supine to sit: Minimal assist of 1   Sit to supine: Minimal assist of 1   Scooting: Minimal assist of 1    Transfers Sit to stand: Not assessed  Patient not following commands and stating \"no, no, no\" when attempting. Sit to stand: Maximal assist of  2 using standard walker    Sit to stand: Minimal assist of 1    Ambulation    not assessed  not assessed   15 feet using  wheeled walker with Minimal assist of 1    Stair negotiation: ascended and descended   Not assessed      Not assessed    ROM BLE flexed posture. Increase range of motion 10% of affected joints    Strength Unable to MMT. Increase strength in affected mm groups by 1/3 grade   Balance Sitting EOB:  fair   Dynamic Standing:  not assessed   Sitting EOB:  good   Dynamic Standing: good with device if needed. Patient is Alert & Oriented x person, place, time and situation  and does not follow directions    Sensation:  Pt denies numbness and tingling to extremities  Edema:  no         Patient education   Patient educated on role of Physical Therapy, risks of immobility and plan of care     Patient response to education:   Pt verbalized understanding Pt demonstrated skill Pt requires further education in this area   Yes Partial No       ASSESSMENT:    Comments:  Pt needing much encouragement to allow rx session. Treatment:  Patient practiced and was instructed in the following treatment:      Sat edge of bed 10 minutes with Moderate assist of 1 to increase dynamic sitting balance and activity tolerance. Performed standing at side of bed x 1 rep with need of mod/max assist x 2 using std walker , pt then having a BM needing RTB. Nursing informed to assist pt with personal hygiene / bedding change.      Therapeutic Exercises: not performed     At end of session, patient in bed with alarm call light and phone within reach,  all lines and tubes

## 2020-05-26 NOTE — FLOWSHEET NOTE
Inpatient Wound Care    Admit Date: 5/22/2020  8:07 PM    Reason for consult:  coccyx    Significant history:    Past Medical History:   Diagnosis Date    Acid reflux     Anesthesia complication     states was given \"shot for prednisone, it caused eyes to burn\" \"I don't want that anesthesiologist again'    Arthritis     R A, OSTEO    Asthma     STABLE, NO RECENT EPISODES    Cancer ([address] 1994    breast LEFT    Depression     STABLE ON MEDS    Fast heart beat     denies recent problems, lst vs Dr Yovanny Ravi 04/2013, on chart    Fibromyalgia     Fracture 9/2012    RIGHT HEEL    Frequency of urination     Headache(784.0)     Hyperlipidemia     Hypertension     CONTROLLED    Hypothyroidism     Irritable bowel syndrome     Osteoporosis     Rheumatoid arthritis ([address]     Wound discharge 2012; 2013     RIGHT HEEL Janae Scot, DSD ON; NO INFECTION OR ISOLATION       Wound history:      Findings:       05/26/20 1550   Wound 05/23/20 Coccyx Mid coccyx wound   Date First Assessed/Time First Assessed: 05/23/20 0130   Present on Hospital Admission: Yes  Primary Wound Type: Pressure Injury  Location: Coccyx  Wound Location Orientation: Mid  Wound Description (Comments): coccyx wound   Dressing Changed Changed/New   Dressing/Treatment Alginate; Foam   Wound Cleansed Rinsed/Irrigated with saline   Wound Length (cm) 4 cm   Wound Width (cm) 3.2 cm   Wound Depth (cm) 1 cm   Wound Surface Area (cm^2) 12.8 cm^2   Change in Wound Size % (l*w) -25   Wound Volume (cm^3) 12.8 cm^3   Wound Healing % -25   Drainage Amount Scant   Drainage Description Serosanguinous; Yellow   Rosenda-wound Assessment Red   rosenda rectal area is red moist  Having loose stool    Impression:  Stage 3 pressure injury    Interventions in place:  Using gentamycin and alginate daily  Low air loss bed    Plan:  Cont gentamycin and alginate daily        Shirin Lazo 5/26/2020 3:52 PM

## 2020-05-26 NOTE — DISCHARGE SUMMARY
tract infection. Review of the patient's most recent hospitalization from [facility] revealed a hospitalization primarily for management of her intractable low back pain. At that time, she underwent MRI evaluation of the low back which found severe disease. She received opinions from 2 separate neurosurgeons who felt she would benefit from surgery at that time but she declined. As mentioned above, the collection of HPI and review of systems information is severely limited secondary to the patient's encephalopathy and mental status as well as the inability to collect history from family at this time. LABS[de-identified]  Lab Results   Component Value Date    WBC 10.3 05/26/2020    HGB 9.6 (L) 05/26/2020    HCT 32.4 (L) 05/26/2020     05/26/2020     05/26/2020    K 4.3 05/26/2020     (H) 05/26/2020    CREATININE 0.8 05/26/2020    BUN 20 05/26/2020    CO2 15 (L) 05/26/2020    GLUCOSE 41 (L) 05/26/2020    ALT 9 05/22/2020    AST 17 05/22/2020    INR 1.0 12/09/2019     Lab Results   Component Value Date    INR 1.0 12/09/2019    INR 1.3 12/04/2015    INR 1.1 12/04/2015    PROTIME 11.7 12/09/2019    PROTIME 14.2 (H) 12/04/2015    PROTIME 12.6 (H) 12/04/2015      Lab Results   Component Value Date    TSH 0.733 12/04/2015     No results found for: TRIG  No results found for: HDL  No results found for: [address]  Lab Results   Component Value Date    LABA1C 4.8 12/08/2015       IMAGING:  Ct Abdomen Pelvis Wo Contrast Additional Contrast? None    Result Date: 5/23/2020  EXAMINATION: CT OF THE ABDOMEN AND PELVIS WITHOUT CONTRAST 5/22/2020 9:37 pm TECHNIQUE: CT of the abdomen and pelvis was performed without the administration of intravenous contrast. Multiplanar reformatted images are provided for review. Dose modulation, iterative reconstruction, and/or weight based adjustment of the mA/kV was utilized to reduce the radiation dose to as low as reasonably achievable.  COMPARISON: None HISTORY: ORDERING further investigation TECHNOLOGIST PROVIDED HISTORY: Reason for exam:->Respiratory failure with hypoxia, further investigation FINDINGS: CHEST Cardiovascular: Normal heart size and pericardium. Minimal LAD coronary calcifications. Normal caliber great vessels. Patent aortic arch branch vessels. Incidental medialization of the bilateral internal carotid arteries. Mediastinum: No pneumomediastinum, hematoma, fluid or gas collection. No enlarged lymph nodes. Normal esophagus. Chest Wall: No acute soft tissue or osseous abnormality. Lungs, Airways and Pleura: No pneumothorax, pleural effusion or airspace consolidation. Right greater than left biapical pleuroparenchymal scarring. Additional linear/bandlike scarring in the right upper lobe. Subpleural reticular abnormality in the anterior left upper lobe. Clear central airways. 2-3 mm peribronchovascular nodular opacity in the right upper lobe on series 4, image 26, most consistent with a punctate benign parenchymal lymph node. ABDOMEN Liver: Normal liver size, contour and parenchymal attenuation. Gallbladder: Surgically absent. Biliary: No intra or extrahepatic bile duct dilatation. Pancreas: Normal. Spleen: Normal. Adrenals:  Normal. Kidneys: Kidneys are symmetric in size, contour and enhancement. No hydronephrosis or nephrolithiasis. GI Tract: No apparent wall thickening or obstruction. Status post partial colectomy. Colonic diverticulosis. Peritoneum/Retroperitoneum: No enlarged lymph nodes, free air or free fluid. Normal caliber abdominal aorta. PELVIS Genitourinary: Urinary bladder gas is likely related to Irizarry catheterization. Normal reproductive organs. Other: No free fluid. No enlarged lymph nodes. MUSCULOSKELETAL Bones and Soft Tissues: Advanced lumbar spondylosis with facet arthrosis and grade 1 anterolisthesis of L3 on L4, L4 on L5 and grade 1 retrolisthesis of L2 on L3. Calcified disc bulges contribute to moderate to severe canal stenosis from L2-L5. scarring. Additional linear/bandlike scarring in the right upper lobe. Subpleural reticular abnormality in the anterior left upper lobe. Clear central airways. 2-3 mm peribronchovascular nodular opacity in the right upper lobe on series 4, image 26, most consistent with a punctate benign parenchymal lymph node. ABDOMEN Liver: Normal liver size, contour and parenchymal attenuation. Gallbladder: Surgically absent. Biliary: No intra or extrahepatic bile duct dilatation. Pancreas: Normal. Spleen: Normal. Adrenals:  Normal. Kidneys: Kidneys are symmetric in size, contour and enhancement. No hydronephrosis or nephrolithiasis. GI Tract: No apparent wall thickening or obstruction. Status post partial colectomy. Colonic diverticulosis. Peritoneum/Retroperitoneum: No enlarged lymph nodes, free air or free fluid. Normal caliber abdominal aorta. PELVIS Genitourinary: Urinary bladder gas is likely related to Irizarry catheterization. Normal reproductive organs. Other: No free fluid. No enlarged lymph nodes. MUSCULOSKELETAL Bones and Soft Tissues: Advanced lumbar spondylosis with facet arthrosis and grade 1 anterolisthesis of L3 on L4, L4 on L5 and grade 1 retrolisthesis of L2 on L3. Calcified disc bulges contribute to moderate to severe canal stenosis from L2-L5. No acute findings in the chest, abdomen or pelvis. Specifically, no acute cardiopulmonary abnormality to account for the patient's hypoxia. No central/saddle pulmonary embolism. Colonic diverticulosis without evidence of diverticulitis. Urinary bladder gas, presumably related to Irizarry catheterization. Advanced lumbar spondylosis.      Xr Chest Portable    Result Date: 5/22/2020  EXAMINATION: ONE XRAY VIEW OF THE CHEST 5/22/2020 9:43 pm COMPARISON: 12/09/2019 radiograph HISTORY: ORDERING SYSTEM PROVIDED HISTORY: hypoxia TECHNOLOGIST PROVIDED HISTORY: Reason for exam:->hypoxia FINDINGS: The heart, mediastinum and pulmonary vascularity are normal.  Lungs are well-expanded and clear. No skeletal abnormalities are present in the chest.     No significant findings in the chest.         HOSPITAL COURSE:   Darlin Perez did well throughout the hospitalization. She was found to be suffering from a urinary tract infection and this was complicated by polypharmacy as an outpatient. With adjustments in her medications and the institution of antibiotics, her mental status improved dramatically. She was also found to have a sacral wound. She was evaluated by the surgical team with recommendations for local wound care. IV antibiotics were transitioned to oral.  Laboratory values and vital signs improved. She continues to require extensive work with the therapy teams and was ultimately deemed acceptable for discharge to a skilled nursing facility. This was arranged accordingly. BRIEF PHYSICAL EXAMINATION AND LABORATORIES ON DAY OF DISCHARGE:  VITALS:  BP (!) 192/94   Pulse 78   Temp 97.8 °F (36.6 °C) (Oral)   Resp 18   Ht 5' 1\" (1.549 m)   Wt 93 lb 12.8 oz (42.5 kg)   SpO2 97%   BMI 17.72 kg/m²     HEENT:    PERRLA. EOMI. Sclera clear. Buccal mucosa moist.  Nasal cannula oxygen is in place. Neck:    Supple. Trachea midline. No thyromegaly. No JVD. No bruits. Heart:    Rhythm regular, rate controlled. Systolic murmur  Lungs:    Symmetrical. Clear to auscultation bilaterally. No wheezes. No rhonchi. No rales. Abdomen:   Soft. Non-tender. Non-distended. Bowel sounds positive. No organomegaly or masses. No pain on palpation  Extremities:    Peripheral pulses present. No peripheral edema. No ulcers. Chronic changes of disuse  Neurologic:    Alert x 3. No focal deficit. Cranial nerves grossly intact. Skin:    No petechia. No hemorrhage. Large sacral wound is appropriately dressed. Psych:   Behavior is normal. Mood appears normal. Speech is not rapid or pressured. Musculokeletal:  Spine ROM normal. Muscular strength intact.  Gait not assessed. Genitalia/Breast:  Deferred    DISPOSITION:  The patient's condition is good. At this time the patient is without objective evidence of an acute process requiring continuing hospitalization or inpatient management. They are stable for discharge with outpatient follow-up. I have spoken with the patient and discussed the results of the current hospitalization, in addition to providing specific details for the plan of care and counseling regarding the diagnosis and prognosis. The plan has been discussed in detail and they are aware of the specific conditions for emergent return, as well as the importance of follow-up. Their questions are answered at this time and they are agreeable with the plan for discharge to nursing home    DISCHARGE MEDICATIONS:    [facility] Medication Instructions [id number]    Printed on:05/26/20 1022   Medication Information                      acetaminophen 650 MG TABS  Take 650 mg by mouth every 4 hours as needed             Albuterol Sulfate (PROAIR HFA IN)  Inhale  into the lungs as needed. Instructed to bring am of surgery             albuterol-ipratropium (COMBIVENT)  MCG/ACT inhaler  Inhale 2 puffs into the lungs every 6 hours as needed for Wheezing             alendronate (FOSAMAX) 35 MG tablet  Take 35 mg by mouth every 7 days. aspirin 81 MG tablet  Take 81 mg by mouth daily. Last dose will be 4-12-13, RESTARTED AFTER SURGERY 4-19-13             calcium carbonate (TUMS) 500 MG chewable tablet  Take 1 tablet by mouth 2 times daily             doxycycline hyclate (VIBRAMYCIN) 100 MG capsule  Take 1 capsule by mouth every 12 hours for 5 days             ferrous sulfate (IRON 325) 325 (65 Fe) MG tablet  Take 325 mg by mouth daily (with breakfast)             gentamicin (GARAMYCIN) 0.1 % cream  Apply topically 3 times daily. isosorbide mononitrate (IMDUR) 30 MG CR tablet  Take 30 mg by mouth daily.  Instructed to take with sip water am of procedure             levoFLOXacin (LEVAQUIN) 250 MG tablet  Take 1 tablet by mouth daily for 5 days             levothyroxine (SYNTHROID) 50 MCG tablet  Take 1 tablet by mouth Daily             metoprolol tartrate (LOPRESSOR) 25 MG tablet  Take 1 tablet by mouth 2 times daily             omeprazole (PRILOSEC) 20 MG capsule  Take 20 mg by mouth daily. Instructed to take with sip water am of procedurebring DOS, 09/24/2013             pantoprazole sodium (PROTONIX) 40 MG PACK packet  Take 40 mg by mouth every morning (before breakfast)             predniSONE (DELTASONE) 5 MG tablet  Take 5 mg by mouth daily. QUEtiapine (SEROQUEL) 25 MG tablet  Take 1 tablet by mouth 2 times daily             risperiDONE (RISPERDAL) 1 MG tablet  Take 1 mg by mouth daily             simvastatin (ZOCOR) 20 MG tablet  Take 20 mg by mouth daily. Bring DOS                 FOLLOW UP/INSTRUCTIONS:  · This patient is instructed to follow-up with her primary care physician. · Patient is instructed to follow-up with the consults listed above as directed by them. · she is instructed to resume home medications and take new medications as indicated in the list above. · If the patient has a recurrence of symptoms, she is instructed to go to the ED. Preparing for this patient's discharge, including paperwork, orders, instructions, and meeting with patient did require > 30 minutes.     Memo Arguello DO   5/26/2020  10:22 AM

## 2020-05-26 NOTE — CARE COORDINATION
SS Note/Discharge plan:  NEGATIVE COVID TEST 5/26, Notified by Romna Ogden admissions for [facility] at the [facility] that they received insurance authorization and transfer confirmed for 7:00pm [facility], pt's elizabeth Zarate and nursing notified of transfer arrangements. Electronically signed by MAYDA Corado on 5/26/2020 at 3:12 PM

## 2020-06-22 PROBLEM — N39.0 UTI (URINARY TRACT INFECTION): Status: RESOLVED | Noted: 2020-01-01 | Resolved: 2020-01-01
